# Patient Record
Sex: MALE | Race: BLACK OR AFRICAN AMERICAN | NOT HISPANIC OR LATINO | Employment: FULL TIME | URBAN - METROPOLITAN AREA
[De-identification: names, ages, dates, MRNs, and addresses within clinical notes are randomized per-mention and may not be internally consistent; named-entity substitution may affect disease eponyms.]

---

## 2023-03-30 ENCOUNTER — OFFICE VISIT (OUTPATIENT)
Dept: INTERNAL MEDICINE | Facility: CLINIC | Age: 33
End: 2023-03-30
Payer: COMMERCIAL

## 2023-03-30 VITALS
DIASTOLIC BLOOD PRESSURE: 70 MMHG | TEMPERATURE: 97.7 F | HEIGHT: 70 IN | BODY MASS INDEX: 31.92 KG/M2 | RESPIRATION RATE: 12 BRPM | WEIGHT: 223 LBS | HEART RATE: 62 BPM | SYSTOLIC BLOOD PRESSURE: 118 MMHG | OXYGEN SATURATION: 100 %

## 2023-03-30 DIAGNOSIS — E66.9 OBESITY (BMI 30-39.9): ICD-10-CM

## 2023-03-30 DIAGNOSIS — S46.812A TRAPEZIUS MUSCLE STRAIN, LEFT, INITIAL ENCOUNTER: ICD-10-CM

## 2023-03-30 DIAGNOSIS — S29.012A UPPER BACK STRAIN, INITIAL ENCOUNTER: Primary | ICD-10-CM

## 2023-03-30 DIAGNOSIS — R53.83 OTHER FATIGUE: ICD-10-CM

## 2023-03-30 DIAGNOSIS — R03.0 ELEVATED BLOOD PRESSURE READING: ICD-10-CM

## 2023-03-30 PROBLEM — G89.29 CHRONIC LEFT SHOULDER PAIN: Status: ACTIVE | Noted: 2023-03-30

## 2023-03-30 PROBLEM — M25.512 CHRONIC LEFT SHOULDER PAIN: Status: ACTIVE | Noted: 2023-03-30

## 2023-03-30 LAB
25(OH)D3 SERPL-MCNC: 26.5 NG/ML (ref 30–100)
ALBUMIN SERPL-MCNC: 4.7 G/DL (ref 3.5–5.2)
ALBUMIN/GLOB SERPL: 1.7 G/DL
ALP SERPL-CCNC: 55 U/L (ref 39–117)
ALT SERPL W P-5'-P-CCNC: 39 U/L (ref 1–41)
ANION GAP SERPL CALCULATED.3IONS-SCNC: 10.2 MMOL/L (ref 5–15)
AST SERPL-CCNC: 32 U/L (ref 1–40)
BASOPHILS # BLD AUTO: 0.08 10*3/MM3 (ref 0–0.2)
BASOPHILS NFR BLD AUTO: 1.1 % (ref 0–1.5)
BILIRUB SERPL-MCNC: 0.6 MG/DL (ref 0–1.2)
BUN SERPL-MCNC: 17 MG/DL (ref 6–20)
BUN/CREAT SERPL: 15.3 (ref 7–25)
CALCIUM SPEC-SCNC: 10 MG/DL (ref 8.6–10.5)
CHLORIDE SERPL-SCNC: 101 MMOL/L (ref 98–107)
CHOLEST SERPL-MCNC: 170 MG/DL (ref 0–200)
CO2 SERPL-SCNC: 26.8 MMOL/L (ref 22–29)
CREAT SERPL-MCNC: 1.11 MG/DL (ref 0.76–1.27)
DEPRECATED RDW RBC AUTO: 39.2 FL (ref 37–54)
EGFRCR SERPLBLD CKD-EPI 2021: 90.5 ML/MIN/1.73
EOSINOPHIL # BLD AUTO: 0.19 10*3/MM3 (ref 0–0.4)
EOSINOPHIL NFR BLD AUTO: 2.6 % (ref 0.3–6.2)
ERYTHROCYTE [DISTWIDTH] IN BLOOD BY AUTOMATED COUNT: 12.8 % (ref 12.3–15.4)
FOLATE SERPL-MCNC: 4.7 NG/ML (ref 4.78–24.2)
GLOBULIN UR ELPH-MCNC: 2.7 GM/DL
GLUCOSE SERPL-MCNC: 81 MG/DL (ref 65–99)
HCT VFR BLD AUTO: 45.9 % (ref 37.5–51)
HDLC SERPL-MCNC: 48 MG/DL (ref 40–60)
HGB BLD-MCNC: 14.8 G/DL (ref 13–17.7)
IMM GRANULOCYTES # BLD AUTO: 0.02 10*3/MM3 (ref 0–0.05)
IMM GRANULOCYTES NFR BLD AUTO: 0.3 % (ref 0–0.5)
LDLC SERPL CALC-MCNC: 111 MG/DL (ref 0–100)
LDLC/HDLC SERPL: 2.31 {RATIO}
LYMPHOCYTES # BLD AUTO: 2.06 10*3/MM3 (ref 0.7–3.1)
LYMPHOCYTES NFR BLD AUTO: 27.7 % (ref 19.6–45.3)
MAGNESIUM SERPL-MCNC: 1.8 MG/DL (ref 1.6–2.6)
MCH RBC QN AUTO: 27.3 PG (ref 26.6–33)
MCHC RBC AUTO-ENTMCNC: 32.2 G/DL (ref 31.5–35.7)
MCV RBC AUTO: 84.7 FL (ref 79–97)
MONOCYTES # BLD AUTO: 0.57 10*3/MM3 (ref 0.1–0.9)
MONOCYTES NFR BLD AUTO: 7.7 % (ref 5–12)
NEUTROPHILS NFR BLD AUTO: 4.53 10*3/MM3 (ref 1.7–7)
NEUTROPHILS NFR BLD AUTO: 60.6 % (ref 42.7–76)
NRBC BLD AUTO-RTO: 0 /100 WBC (ref 0–0.2)
PLATELET # BLD AUTO: 217 10*3/MM3 (ref 140–450)
PMV BLD AUTO: 10.8 FL (ref 6–12)
POTASSIUM SERPL-SCNC: 4.3 MMOL/L (ref 3.5–5.2)
PROT SERPL-MCNC: 7.4 G/DL (ref 6–8.5)
RBC # BLD AUTO: 5.42 10*6/MM3 (ref 4.14–5.8)
SODIUM SERPL-SCNC: 138 MMOL/L (ref 136–145)
T3FREE SERPL-MCNC: 3.52 PG/ML (ref 2–4.4)
T4 FREE SERPL-MCNC: 1.3 NG/DL (ref 0.93–1.7)
TRIGL SERPL-MCNC: 56 MG/DL (ref 0–150)
TSH SERPL DL<=0.05 MIU/L-ACNC: 1.47 UIU/ML (ref 0.27–4.2)
VIT B12 BLD-MCNC: 911 PG/ML (ref 211–946)
VLDLC SERPL-MCNC: 11 MG/DL (ref 5–40)
WBC NRBC COR # BLD: 7.45 10*3/MM3 (ref 3.4–10.8)

## 2023-03-30 PROCEDURE — 82306 VITAMIN D 25 HYDROXY: CPT | Performed by: INTERNAL MEDICINE

## 2023-03-30 PROCEDURE — 80061 LIPID PANEL: CPT | Performed by: INTERNAL MEDICINE

## 2023-03-30 PROCEDURE — 82607 VITAMIN B-12: CPT | Performed by: INTERNAL MEDICINE

## 2023-03-30 PROCEDURE — 84481 FREE ASSAY (FT-3): CPT | Performed by: INTERNAL MEDICINE

## 2023-03-30 PROCEDURE — 83735 ASSAY OF MAGNESIUM: CPT | Performed by: INTERNAL MEDICINE

## 2023-03-30 PROCEDURE — 80050 GENERAL HEALTH PANEL: CPT | Performed by: INTERNAL MEDICINE

## 2023-03-30 PROCEDURE — 84439 ASSAY OF FREE THYROXINE: CPT | Performed by: INTERNAL MEDICINE

## 2023-03-30 PROCEDURE — 82746 ASSAY OF FOLIC ACID SERUM: CPT | Performed by: INTERNAL MEDICINE

## 2023-03-30 RX ORDER — NAPROXEN 500 MG/1
500 TABLET ORAL 2 TIMES DAILY WITH MEALS
Qty: 60 TABLET | Refills: 0 | Status: SHIPPED | OUTPATIENT
Start: 2023-03-30

## 2023-03-30 NOTE — PROGRESS NOTES
"CHIEF COMPLAINT  Broolkyn Garcia presents to [facility] to left shoulder pain (31 yo male here today for left shoulder pain. States in been in pain for a while now and seems to be worse after working out. Has tried over the counter creams to help with the pain. ) and Pain     HPI  32-year-old male patient here to establish care.  Main concern is left shoulder pain x 3-4 weeks- lifting 25 lbs 4-5x/week-did a new work out-left post upper back-2-3/10 in severity-using otc muscle rehab  States otc ointment is helping-    Eats out daily-at fast food [employer]-and [employer]  SH-1/2 PPD-works at [employer]-no ETOH--works 12 hr days 7 d/week    Past History:  Allergies: Patient has no known allergies.   Medical History: has no past medical history on file.   Surgical History: has no past surgical history on file.   Family History: family history is not on file.   Social History: reports that he has been smoking cigarettes. He has been smoking an average of .5 packs per day. He has been exposed to tobacco smoke. He has never used smokeless tobacco.  Social History     Social History Narrative   • Not on file         Current Outpatient Medications:   •  naproxen (Naprosyn) 500 MG tablet, Take 1 tablet by mouth 2 (Two) Times a Day With Meals. Prn pain, Disp: 60 tablet, Rfl: 0     OBJECTIVE  Vital Signs  Vitals:    03/30/23 0810 03/30/23 0900   BP: 138/66 118/70   BP Location: Left arm Left arm   Patient Position: Sitting Sitting   Cuff Size:  Large Adult   Pulse: 62    Resp: 12    Temp: 97.7 °F (36.5 °C)    TempSrc: Skin    SpO2: 100%    Weight: 101 kg (223 lb)    Height: 177.8 cm (70\")       Body mass index is 32 kg/m².    Review of Systems left shoulder pain    Physical Exam  Vitals and nursing note reviewed.   Constitutional:       Appearance: Normal appearance. He is obese.   HENT:      Head: Normocephalic and atraumatic.      Nose: Nose normal.   Eyes:      Extraocular Movements: Extraocular " movements intact.      Pupils: Pupils are equal, round, and reactive to light.   Cardiovascular:      Rate and Rhythm: Normal rate and regular rhythm.   Pulmonary:      Effort: Pulmonary effort is normal.      Breath sounds: Normal breath sounds.   Abdominal:      General: Abdomen is flat.      Palpations: Abdomen is soft.   Musculoskeletal:         General: Tenderness present.      Cervical back: Normal range of motion and neck supple.      Comments: Tender to palpation left upper back-neck with full range of motion-negative arc sign full range of motion of left shoulder.   Skin:     General: Skin is warm and dry.   Neurological:      General: No focal deficit present.      Mental Status: He is alert and oriented to person, place, and time.   Psychiatric:         Mood and Affect: Mood normal.         Behavior: Behavior normal.         RESULTS REVIEW  No results found for: PROBNP, BNP          No results found for: TSH   No results found for: FREET4         No Images in the past 120 days found..              ASSESSMENT & PLAN  Diagnoses and all orders for this visit:    1. Upper back strain, initial encounter (Primary)  Comments:  Likely from exacerbation of trapezius muscle due to new workout.  Use anti-inflammatories naproxen 1 twice daily as needed heating pad and OTC ointment prn  Orders:  -     Comprehensive Metabolic Panel; Future  -     Lipid Panel; Future  -     TSH+Free T4; Future  -     T3, Free; Future  -     Vitamin B12; Future  -     Folate; Future  -     Magnesium; Future  -     Vitamin D,25-Hydroxy; Future  -     CBC & Differential; Future  -     naproxen (Naprosyn) 500 MG tablet; Take 1 tablet by mouth 2 (Two) Times a Day With Meals. Prn pain  Dispense: 60 tablet; Refill: 0  -     CBC & Differential  -     Vitamin D,25-Hydroxy  -     Magnesium  -     Folate  -     Vitamin B12  -     T3, Free  -     TSH+Free T4  -     Lipid Panel  -     Comprehensive Metabolic Panel    2. Trapezius muscle strain,  left, initial encounter  Assessment & Plan:  Upper back exercises given use daily.  Avoid excessive use of area.  Follow-up in 2 weeks do labs today.    Orders:  -     Comprehensive Metabolic Panel; Future  -     Lipid Panel; Future  -     TSH+Free T4; Future  -     T3, Free; Future  -     Vitamin B12; Future  -     Folate; Future  -     Magnesium; Future  -     Vitamin D,25-Hydroxy; Future  -     CBC & Differential; Future  -     naproxen (Naprosyn) 500 MG tablet; Take 1 tablet by mouth 2 (Two) Times a Day With Meals. Prn pain  Dispense: 60 tablet; Refill: 0  -     CBC & Differential  -     Vitamin D,25-Hydroxy  -     Magnesium  -     Folate  -     Vitamin B12  -     T3, Free  -     TSH+Free T4  -     Lipid Panel  -     Comprehensive Metabolic Panel    3. Elevated blood pressure reading  Comments:  Ideal BP equals 120/80 average.  Monitor blood pressure decrease going out daily to fast foods decrease salt intake to 2 g daily    4. Obesity (BMI 30-39.9)  -     Comprehensive Metabolic Panel; Future  -     Lipid Panel; Future  -     TSH+Free T4; Future  -     T3, Free; Future  -     Vitamin B12; Future  -     Folate; Future  -     Magnesium; Future  -     Vitamin D,25-Hydroxy; Future  -     CBC & Differential; Future  -     naproxen (Naprosyn) 500 MG tablet; Take 1 tablet by mouth 2 (Two) Times a Day With Meals. Prn pain  Dispense: 60 tablet; Refill: 0  -     CBC & Differential  -     Vitamin D,25-Hydroxy  -     Magnesium  -     Folate  -     Vitamin B12  -     T3, Free  -     TSH+Free T4  -     Lipid Panel  -     Comprehensive Metabolic Panel    5. Other fatigue  -     Comprehensive Metabolic Panel; Future  -     Lipid Panel; Future  -     TSH+Free T4; Future  -     T3, Free; Future  -     Vitamin B12; Future  -     Folate; Future  -     Magnesium; Future  -     Vitamin D,25-Hydroxy; Future  -     CBC & Differential; Future  -     naproxen (Naprosyn) 500 MG tablet; Take 1 tablet by mouth 2 (Two) Times a Day With  Meals. Prn pain  Dispense: 60 tablet; Refill: 0  -     CBC & Differential  -     Vitamin D,25-Hydroxy  -     Magnesium  -     Folate  -     Vitamin B12  -     T3, Free  -     TSH+Free T4  -     Lipid Panel  -     Comprehensive Metabolic Panel                FOLLOW UP  Return in about 2 weeks (around 4/13/2023) for Recheck.  Monitor blood pressure at next visit check on upper back strain and labs.    Patient was given instructions and counseling regarding his condition or for health maintenance advice. Please see specific information pulled into the AVS if appropriate.

## 2023-03-30 NOTE — ASSESSMENT & PLAN NOTE
Upper back exercises given use daily.  Avoid excessive use of area.  Follow-up in 2 weeks do labs today.

## 2023-04-13 NOTE — PROGRESS NOTES
CHIEF COMPLAINT  Brooklyn Garcia presents to [facility] for follow-up of Follow-up (32 year old male here today for a follow up on blood work./He would like a prescription for Diclofenac gel for his left shoulder pain. States the Naproxen is not helping. States he hurt it working out. States decreased range of motion. /He denies any other issues or concerns at this time. ).    HPI    32-year-old male here for follow-up of upper back/left shoulder strain and elevated blood pressure.  Seen as initial visit 2 weeks ago.    Complaint of continued left shoulder pain along clavicle and acromioclavicular joint area rates pain at 2-3 out of 10.  Patient states he is still working and exercising but not lifting as much is 25 pounds.  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  He states his physical work is normal.    Records reviewed, meds reviewed in detail, labs reviewed with patient.  Plan of care is as follows below.    [birth year] visit  Upper back/left shoulder pain x 3-4 weeks- lifting 25 lbs 4-5x/week-did a new work out-left post upper back-2-3/10 in severity-using otc muscle rehab  States otc ointment is helping-        Current Outpatient Medications:   •  naproxen (Naprosyn) 500 MG tablet, Take 1 tablet by mouth 2 (Two) Times a Day With Meals. Prn pain, Disp: 60 tablet, Rfl: 0  •  Diclofenac Sodium (VOLTAREN) 1 % gel gel, Apply 4 g topically to the appropriate area as directed 4 (Four) Times a Day., Disp: 350 g, Rfl: 1  •  methylPREDNISolone (MEDROL) 4 MG dose pack, Take 6 tablets by mouth Daily for 1 day, THEN 5 tablets Daily for 1 day, THEN 4 tablets Daily for 1 day, THEN 3 tablets Daily for 1 day, THEN 2 tablets Daily for 1 day, THEN 1 tablet Daily for 1 day. Take as directed on package instructions., Disp: 21 tablet, Rfl: 0  •  vitamin D (ERGOCALCIFEROL) 1.25 MG (60410 UT) capsule capsule, Take 1 capsule  "by mouth 1 (One) Time Per Week., Disp: 8 capsule, Rfl: 0  •  Vitamin D, Cholecalciferol, 50 MCG (2000 UT) capsule, Take 1 capsule by mouth Daily., Disp: 90 capsule, Rfl: 1   PFSH reviewed.      OBJECTIVE  Vital Signs  Vitals:    04/17/23 0738   BP: 118/78   BP Location: Left arm   Patient Position: Sitting   Pulse: 79   Resp: 18   Temp: 98.4 °F (36.9 °C)   TempSrc: Temporal   SpO2: 98%   Weight: 100 kg (221 lb 6.4 oz)   Height: 177.8 cm (70\")      Body mass index is 31.77 kg/m².    Physical Exam  Vitals and nursing note reviewed.   Constitutional:       Appearance: Normal appearance. He is obese.   HENT:      Head: Normocephalic and atraumatic.      Nose: Nose normal.   Eyes:      Extraocular Movements: Extraocular movements intact.      Pupils: Pupils are equal, round, and reactive to light.   Cardiovascular:      Rate and Rhythm: Normal rate and regular rhythm.   Pulmonary:      Effort: Pulmonary effort is normal.      Breath sounds: Normal breath sounds.   Abdominal:      General: Abdomen is flat.      Palpations: Abdomen is soft.   Musculoskeletal:      Cervical back: Normal range of motion and neck supple.      Comments: Negative arc sign.  Slightly tender at the acromioclavicular joint on the left shoulder.  Good internal/external rotation abduction and adduction.   Skin:     General: Skin is warm and dry.   Neurological:      General: No focal deficit present.      Mental Status: He is alert and oriented to person, place, and time.   Psychiatric:         Mood and Affect: Mood normal.         Behavior: Behavior normal.          RESULTS REVIEW  No results found for: PROBNP, BNP  CMP        3/30/2023    09:16   CMP   Glucose 81     BUN 17     Creatinine 1.11     EGFR 90.5     Sodium 138     Potassium 4.3     Chloride 101     Calcium 10.0     Total Protein 7.4     Albumin 4.7     Globulin 2.7     Total Bilirubin 0.6     Alkaline Phosphatase 55     AST (SGOT) 32     ALT (SGPT) 39     Albumin/Globulin Ratio 1.7   "   BUN/Creatinine Ratio 15.3     Anion Gap 10.2       CBC w/diff        3/30/2023    09:16   CBC w/Diff   WBC 7.45     RBC 5.42     Hemoglobin 14.8     Hematocrit 45.9     MCV 84.7     MCH 27.3     MCHC 32.2     RDW 12.8     Platelets 217     Neutrophil Rel % 60.6     Immature Granulocyte Rel % 0.3     Lymphocyte Rel % 27.7     Monocyte Rel % 7.7     Eosinophil Rel % 2.6     Basophil Rel % 1.1        Lipid Panel        3/30/2023    09:16   Lipid Panel   Total Cholesterol 170     Triglycerides 56     HDL Cholesterol 48     VLDL Cholesterol 11     LDL Cholesterol  111     LDL/HDL Ratio 2.31        Lab Results   Component Value Date    TSH 1.470 03/30/2023      Lab Results   Component Value Date    FREET4 1.30 03/30/2023         Lab Results   Component Value Date    ATAKOOIO36 911 03/30/2023    LGFX49EM 26.5 (L) 03/30/2023    MG 1.8 03/30/2023        No Images in the past 120 days found..             ASSESSMENT & PLAN  Diagnoses and all orders for this visit:    1. Acute pain of left shoulder (Primary)  Comments:  Tender at the acromioclavicular joint.  Negative arc sign pain 2-3out of 10.Plan-give diclofenac gel-do shoulder exercises-declined PT for now.Naprosyn prn  Orders:  -     XR Shoulder 2+ View Left; Future  -     methylPREDNISolone (MEDROL) 4 MG dose pack; Take 6 tablets by mouth Daily for 1 day, THEN 5 tablets Daily for 1 day, THEN 4 tablets Daily for 1 day, THEN 3 tablets Daily for 1 day, THEN 2 tablets Daily for 1 day, THEN 1 tablet Daily for 1 day. Take as directed on package instructions.  Dispense: 21 tablet; Refill: 0  -     Diclofenac Sodium (VOLTAREN) 1 % gel gel; Apply 4 g topically to the appropriate area as directed 4 (Four) Times a Day.  Dispense: 350 g; Refill: 1    2. Vitamin D deficiency  -     vitamin D (ERGOCALCIFEROL) 1.25 MG (33073 UT) capsule capsule; Take 1 capsule by mouth 1 (One) Time Per Week.  Dispense: 8 capsule; Refill: 0  -     Vitamin D, Cholecalciferol, 50 MCG (2000 UT) capsule;  Take 1 capsule by mouth Daily.  Dispense: 90 capsule; Refill: 1    3. Elevated blood pressure reading  Comments:  Blood pressure normal today.  Pain still present but not worse than last visit.              Patient Instructions   X-ray of the left shoulder  Try diclofenac gel as directed  Try Medrol Dosepak  Consider physical therapy if not better declined at this time-use exercises as directed  Follow-up in 2-month  Start vitamin D 50,000 units once a week for 2 months and then D3 2000 units daily         FOLLOW UP  Return in about 2 months (around 6/17/2023) for Recheck.    Patient was given instructions and counseling regarding his condition or for health maintenance advice. Please see specific information pulled into the AVS if appropriate.

## 2023-04-17 ENCOUNTER — OFFICE VISIT (OUTPATIENT)
Dept: INTERNAL MEDICINE | Facility: CLINIC | Age: 33
End: 2023-04-17
Payer: COMMERCIAL

## 2023-04-17 VITALS
WEIGHT: 221.4 LBS | DIASTOLIC BLOOD PRESSURE: 78 MMHG | TEMPERATURE: 98.4 F | OXYGEN SATURATION: 98 % | SYSTOLIC BLOOD PRESSURE: 118 MMHG | RESPIRATION RATE: 18 BRPM | BODY MASS INDEX: 31.7 KG/M2 | HEIGHT: 70 IN | HEART RATE: 79 BPM

## 2023-04-17 DIAGNOSIS — R03.0 ELEVATED BLOOD PRESSURE READING: ICD-10-CM

## 2023-04-17 DIAGNOSIS — E55.9 VITAMIN D DEFICIENCY: ICD-10-CM

## 2023-04-17 DIAGNOSIS — M25.512 ACUTE PAIN OF LEFT SHOULDER: Primary | ICD-10-CM

## 2023-04-17 PROCEDURE — 99213 OFFICE O/P EST LOW 20 MIN: CPT | Performed by: INTERNAL MEDICINE

## 2023-04-17 RX ORDER — ERGOCALCIFEROL 1.25 MG/1
50000 CAPSULE ORAL WEEKLY
Qty: 8 CAPSULE | Refills: 0 | Status: SHIPPED | OUTPATIENT
Start: 2023-04-17

## 2023-04-17 RX ORDER — MULTIVIT-MIN/IRON/FOLIC ACID/K 18-600-40
1 CAPSULE ORAL DAILY
Qty: 90 CAPSULE | Refills: 1 | Status: SHIPPED | OUTPATIENT
Start: 2023-04-17

## 2023-04-17 RX ORDER — METHYLPREDNISOLONE 4 MG/1
TABLET ORAL
Qty: 21 TABLET | Refills: 0 | Status: SHIPPED | OUTPATIENT
Start: 2023-04-17 | End: 2023-04-23

## 2023-04-17 NOTE — PATIENT INSTRUCTIONS
X-ray of the left shoulder  Try diclofenac gel as directed  Try Medrol Dosepak  Consider physical therapy if not better declined at this time-use exercises as directed  Follow-up in 2-month  Start vitamin D 50,000 units once a week for 2 months and then D3 2000 units daily

## 2024-04-02 ENCOUNTER — OFFICE VISIT (OUTPATIENT)
Dept: INTERNAL MEDICINE | Age: 34
End: 2024-04-02
Payer: COMMERCIAL

## 2024-04-02 VITALS
HEIGHT: 70 IN | HEART RATE: 83 BPM | OXYGEN SATURATION: 96 % | WEIGHT: 230 LBS | SYSTOLIC BLOOD PRESSURE: 132 MMHG | BODY MASS INDEX: 32.93 KG/M2 | DIASTOLIC BLOOD PRESSURE: 70 MMHG | TEMPERATURE: 98.4 F

## 2024-04-02 DIAGNOSIS — E55.9 VITAMIN D DEFICIENCY: ICD-10-CM

## 2024-04-02 DIAGNOSIS — M25.512 LEFT SHOULDER PAIN, UNSPECIFIED CHRONICITY: Primary | ICD-10-CM

## 2024-04-02 DIAGNOSIS — D17.1 LIPOMA OF BACK: ICD-10-CM

## 2024-04-02 DIAGNOSIS — Z13.6 ENCOUNTER FOR SCREENING FOR CARDIOVASCULAR DISORDERS: ICD-10-CM

## 2024-04-02 PROCEDURE — 99213 OFFICE O/P EST LOW 20 MIN: CPT | Performed by: INTERNAL MEDICINE

## 2024-04-02 NOTE — PROGRESS NOTES
"Ref surg  CHIEF COMPLAINT  Brooklyn Garcia presents to [facility] for follow-up of Shoulder Pain (Pt is a 33 y.o. male, present in office today with c/o left shoulder pain. Pt has been seen for this issue before, states we have tried oral medication and topical medication.) and Cyst (Pt reports a small \"bump\" on his left back side, states he has had this looked at before.).    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  Seen as initial visit 2 weeks ago.     Complaint of continued left shoulder pain along clavicle and acromioclavicular joint area rates pain at 2-3 out of 10.  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  He states his physical work is normal.     Records reviewed, meds reviewed in detail, labs reviewed with patient.  Plan of care is as follows below.     [birth year] visit  Upper back/left shoulder pain x 3-4 weeks- lifting 25 lbs 4-5x/week-did a new work out-left post upper back-2-3/10 in severity-using otc muscle rehab  States otc ointment is helping-            Current Outpatient Medications:     " "Diclofenac Sodium (VOLTAREN) 1 % gel gel, Apply 4 g topically to the appropriate area as directed 4 (Four) Times a Day., Disp: 350 g, Rfl: 1    naproxen (Naprosyn) 500 MG tablet, Take 1 tablet by mouth 2 (Two) Times a Day With Meals. Prn pain, Disp: 60 tablet, Rfl: 0    vitamin D (ERGOCALCIFEROL) 1.25 MG (64698 UT) capsule capsule, Take 1 capsule by mouth 1 (One) Time Per Week., Disp: 8 capsule, Rfl: 0    Vitamin D, Cholecalciferol, 50 MCG (2000 UT) capsule, Take 1 capsule by mouth Daily., Disp: 90 capsule, Rfl: 1   PFSH reviewed.      OBJECTIVE  Vital Signs  Vitals:    04/02/24 1454   BP: 132/70   BP Location: Left arm   Patient Position: Sitting   Cuff Size: Adult   Pulse: 83   Temp: 98.4 °F (36.9 °C)   TempSrc: Temporal   SpO2: 96%   Weight: 104 kg (230 lb)   Height: 177.8 cm (70\")      Body mass index is 33 kg/m².    Physical Exam  Vitals and nursing note reviewed.   Constitutional:       Appearance: Normal appearance.   HENT:      Head: Normocephalic and atraumatic.      Nose: Nose normal.   Eyes:      Extraocular Movements: Extraocular movements intact.      Pupils: Pupils are equal, round, and reactive to light.   Cardiovascular:      Rate and Rhythm: Normal rate and regular rhythm.   Pulmonary:      Effort: Pulmonary effort is normal.      Breath sounds: Normal breath sounds.   Abdominal:      General: Abdomen is flat.      Palpations: Abdomen is soft.   Musculoskeletal:      Cervical back: Normal range of motion and neck supple.      Comments: Tender at left A-C joint-neg ARC sign-FROM   Skin:     General: Skin is warm and dry.      Comments: 4 cm diameter nodular lesion -on right upper back   Neurological:      General: No focal deficit present.      Mental Status: He is alert and oriented to person, place, and time.   Psychiatric:         Mood and Affect: Mood normal.         Behavior: Behavior normal.          RESULTS REVIEW  No results found for: \"PROBNP\", \"BNP\"          Lab Results   Component Value " Date    TSH 1.470 03/30/2023      Lab Results   Component Value Date    FREET4 1.30 03/30/2023         Lab Results   Component Value Date    [id number] 911 03/30/2023    VDQX21YX 26.5 (L) 03/30/2023    MG 1.8 03/30/2023        No Images in the past 120 days found..             ASSESSMENT & PLAN  Diagnoses and all orders for this visit:    1. Left shoulder pain, unspecified chronicity (Primary)  Comments:  get xray-r/o rotator cuff injury-refer to PT and Ortho  Orders:  -     XR Shoulder 2+ View Left; Future  -     Ambulatory Referral to Physical Therapy Evaluate and treat  -     Ambulatory Referral to Orthopedic Surgery  -     Comprehensive Metabolic Panel; Future  -     Vitamin B12; Future  -     Folate; Future  -     Magnesium; Future  -     Vitamin D,25-Hydroxy; Future    2. Lipoma of back  Comments:  4 cm circular area-- present for months-refer to Surgeon  Orders:  -     XR Shoulder 2+ View Left; Future  -     Ambulatory Referral to Physical Therapy Evaluate and treat  -     Comprehensive Metabolic Panel; Future  -     Vitamin B12; Future  -     Folate; Future  -     Magnesium; Future  -     Vitamin D,25-Hydroxy; Future  -     Ambulatory Referral to General Surgery    3. Encounter for screening for cardiovascular disorders  -     XR Shoulder 2+ View Left; Future  -     Ambulatory Referral to Physical Therapy Evaluate and treat  -     Comprehensive Metabolic Panel; Future  -     Vitamin B12; Future  -     Folate; Future  -     Magnesium; Future  -     Vitamin D,25-Hydroxy; Future    4. Vitamin D deficiency  Comments:  s/p high dose vit D-recheck levels-denies fatigue  Orders:  -     XR Shoulder 2+ View Left; Future  -     Ambulatory Referral to Physical Therapy Evaluate and treat  -     Comprehensive Metabolic Panel; Future  -     Vitamin B12; Future  -     Folate; Future  -     Magnesium; Future  -     Vitamin D,25-Hydroxy; Future         BMI is >= 30 and <35. (Class 1 Obesity). The following options were  offered after discussion;: weight loss educational material (shared in after visit summary), exercise counseling/recommendations, and nutrition counseling/recommendations       Patient Instructions   Get xray of left shoulder  Refer to PT and Ortho  Refer to surgeon for lipoma on back       FOLLOW UP  Return in about 4 weeks (around 4/30/2024) for Recheck, Annual physical.    Patient was given instructions and counseling regarding his condition or for health maintenance advice. Please see specific information pulled into the AVS if appropriate.

## 2024-04-17 ENCOUNTER — OFFICE VISIT (OUTPATIENT)
Dept: SURGERY | Facility: CLINIC | Age: 34
End: 2024-04-17
Payer: COMMERCIAL

## 2024-04-17 VITALS
DIASTOLIC BLOOD PRESSURE: 104 MMHG | WEIGHT: 232 LBS | HEART RATE: 91 BPM | SYSTOLIC BLOOD PRESSURE: 127 MMHG | HEIGHT: 70 IN | BODY MASS INDEX: 33.21 KG/M2

## 2024-04-17 DIAGNOSIS — D17.1 LIPOMA OF BACK: Primary | ICD-10-CM

## 2024-04-17 PROCEDURE — 99203 OFFICE O/P NEW LOW 30 MIN: CPT | Performed by: STUDENT IN AN ORGANIZED HEALTH CARE EDUCATION/TRAINING PROGRAM

## 2024-04-17 NOTE — PROGRESS NOTES
Patient Name:  Brooklyn Garcia  YOB: 1990  [id number]    Referring Provider: Mackenzie Price*    Patient Care Team:  Mackenzie Price MD as PCP - General (Internal Medicine)      Chief Complaint  BACK LIPOMA    Subjective     Brooklyn Garcia is a 33 y.o. male who presents to [facility] GENERAL SURGERY    History of Present Illness  33-year-old male who presents today as a new referral for a lipoma along the upper right side of his back.  Patient states he had a bump in this area for several months and he thinks it may have become somewhat larger.  He does note some mild pain in the area but has no limitations with range of motion.  He denies any nausea, vomiting, fevers, chills.  He has no history of trauma to this area.      History     Past Medical History:   Diagnosis Date    Lipoma of back     Shoulder pain, left        History reviewed. No pertinent surgical history.    Family History   Problem Relation Age of Onset    No Known Problems Mother     No Known Problems Father        Social History     Tobacco Use    Smoking status: Former     Current packs/day: 0.25     Average packs/day: 0.3 packs/day for 6.3 years (1.6 ttl pk-yrs)     Types: Cigarettes     Start date: 2018     Passive exposure: Current    Smokeless tobacco: Never   Vaping Use    Vaping status: Every Day    Substances: Nicotine, Flavoring    Devices: Disposable   Substance Use Topics    Alcohol use: Not Currently    Drug use: Never       No Known Allergies    Prior to Admission medications    Medication Sig Start Date End Date Taking? Authorizing Provider   Diclofenac Sodium (VOLTAREN) 1 % gel gel Apply 4 g topically to the appropriate area as directed 4 (Four) Times a Day.  Patient not taking: Reported on 4/17/2024 4/17/23   Mackenzie Price MD   naproxen (Naprosyn) 500 MG tablet Take 1 tablet by mouth 2 (Two) Times a Day With Meals. Prn pain  Patient not taking: Reported on  "4/17/2024 3/30/23   Mackenzie Price MD   vitamin D (ERGOCALCIFEROL) 1.25 MG (01517 UT) capsule capsule Take 1 capsule by mouth 1 (One) Time Per Week.  Patient not taking: Reported on 4/17/2024 4/17/23   Mackenzie Price MD   Vitamin D, Cholecalciferol, 50 MCG (2000 UT) capsule Take 1 capsule by mouth Daily.  Patient not taking: Reported on 4/17/2024 4/17/23   Mackenzie Price MD       Objective    Objective              Vital Signs:   BP (!) 127/104 (BP Location: Left arm, Patient Position: Sitting, Cuff Size: Adult)   Pulse 91   Ht 177.8 cm (70\")   Wt 105 kg (232 lb)   BMI 33.29 kg/m²       Physical Exam  Constitutional:       Appearance: Normal appearance.   HENT:      Head: Normocephalic and atraumatic.      Mouth/Throat:      Mouth: Mucous membranes are moist.      Pharynx: Oropharynx is clear.   Cardiovascular:      Rate and Rhythm: Normal rate and regular rhythm.   Pulmonary:      Effort: Pulmonary effort is normal. No respiratory distress.   Abdominal:      General: There is no distension.      Palpations: Abdomen is soft.      Tenderness: There is no abdominal tenderness.   Musculoskeletal:         General: No swelling. Normal range of motion.      Cervical back: Normal range of motion and neck supple.      Comments: 6 cm soft lipoma along the posterior right shoulder without any overlying skin changes   Skin:     General: Skin is warm and dry.   Neurological:      General: No focal deficit present.      Mental Status: He is alert and oriented to person, place, and time.   Psychiatric:         Mood and Affect: Mood normal.         Behavior: Behavior normal.                Assessment / Plan      Diagnoses and all orders for this visit:    1. Lipoma of back (Primary)    33-year-old male with lipoma along his upper right back.  He desires surgical excision.  Will plan for outpatient lipoma excision and any other indicated procedure.  " Risks/benefits/alternatives of the procedure were explained to the patient and he was agreeable to proceed.  He would like to first figure out a date within the next 1 to 2 months that is compatible with his work schedule before making a surgery appointment.  Will await further contact from him and schedule in the future.    Follow Up   Return for will call to schedule surgery in the near future.      Patient was given instructions and counseling regarding his condition or for health maintenance advice. Please see specific information pulled into the AVS if appropriate.     Electronically signed by Karel Chun MD, 04/17/24, 3:20 PM EDT.

## 2024-04-25 ENCOUNTER — OFFICE VISIT (OUTPATIENT)
Dept: ORTHOPEDIC SURGERY | Facility: CLINIC | Age: 34
End: 2024-04-25
Payer: COMMERCIAL

## 2024-04-25 VITALS — HEIGHT: 70 IN | WEIGHT: 230 LBS | BODY MASS INDEX: 32.93 KG/M2

## 2024-04-25 DIAGNOSIS — S49.92XS INJURY OF LEFT SHOULDER, SEQUELA: ICD-10-CM

## 2024-04-25 DIAGNOSIS — M25.512 LEFT SHOULDER PAIN, UNSPECIFIED CHRONICITY: Primary | ICD-10-CM

## 2024-04-25 DIAGNOSIS — S43.432A SUPERIOR GLENOID LABRUM LESION OF LEFT SHOULDER, INITIAL ENCOUNTER: ICD-10-CM

## 2024-04-25 NOTE — PROGRESS NOTES
"Chief Complaint  Initial Evaluation of the Left Shoulder     Subjective      Brooklyn Garcia presents to [facility] for evaluation of the left shoulder. He reports left shoulder pain for over a year after a pop in his shoulder while working out. He reports weakness and decreased motion since the injury. He locates pain to the acromioclavicular joint. He has tried topicals and oral NSAIDS without relief.       No Known Allergies     Social History     Socioeconomic History    Marital status: Single   Tobacco Use    Smoking status: Former     Current packs/day: 0.25     Average packs/day: 0.3 packs/day for 6.3 years (1.6 ttl pk-yrs)     Types: Cigarettes     Start date: 2018     Passive exposure: Current    Smokeless tobacco: Never   Vaping Use    Vaping status: Every Day    Substances: Nicotine, Flavoring    Devices: Disposable   Substance and Sexual Activity    Alcohol use: Not Currently    Drug use: Never        I reviewed the patient's chief complaint, history of present illness, review of systems, past medical history, surgical history, family history, social history, medications, and allergy list.     Review of Systems     Constitutional: Denies fevers, chills, weight loss  Cardiovascular: Denies chest pain, shortness of breath  Skin: Denies rashes, acute skin changes  Neurologic: Denies headache, loss of consciousness  MSK: Left shoulder pain      Vital Signs:   Ht 177.8 cm (70\")   Wt 104 kg (230 lb)   BMI 33.00 kg/m²          Physical Exam  General: Alert. No acute distress    Ortho Exam      Left shoulder- Sensation to light touch median, radial, ulnar nerve. Positive AIN, PIN, ulnar nerve motor function. Positive pulses. Tender to the anterior shoulder over the acromioclavicular joint and bicipital groove.  Forward elevation 180. Abduction 160, External Rotation 90, internal rotation 80. 5/5 rotator cuff strength, internal rotation L-1. Positive impingement signs. positive  " O'haris. Negative cross arm adduction     Procedures    X-Ray Report:  Left scapula X-Ray  Indication: Evaluation of left shoulder pain  AP/Lateral view(s)  Findings: no acute fracture. No significant degenerative changes. No osseous abnormality.   Prior studies available for comparison: no       Imaging Results (Most Recent)       Procedure Component Value Units Date/Time    XR Scapula Left [[id number]] Resulted: 04/25/24 1559     Updated: 04/25/24 1603             Result Review :       No results found.           Assessment and Plan     Diagnoses and all orders for this visit:    1. Left shoulder pain, unspecified chronicity (Primary)  -     XR Scapula Left    2. Superior glenoid labrum lesion of left shoulder, initial encounter    3. Injury of left shoulder, sequela        Discussed the treatment plan with the patient.  I reviewed the x-rays that were obtained today with the patient. Plan for MRI Arthrogram to evaluate for a slap tear. The patient expressed understanding and wished to proceed.     Call or return if worsening symptoms.    Follow Up     MRI Arthrogram results      Patient was given instructions and counseling regarding his condition or for health maintenance advice. Please see specific information pulled into the AVS if appropriate.     Scribed for Rafita Nieves MD by Cece Unger.  04/25/24   16:08 EDT    I have personally performed the services described in this document as scribed by the above individual and it is both accurate and complete. Rafita Nieves MD 04/26/24

## 2024-05-15 ENCOUNTER — TREATMENT (OUTPATIENT)
Dept: PHYSICAL THERAPY | Facility: CLINIC | Age: 34
End: 2024-05-15
Payer: COMMERCIAL

## 2024-05-15 DIAGNOSIS — M25.512 ACUTE PAIN OF LEFT SHOULDER: Primary | ICD-10-CM

## 2024-05-15 DIAGNOSIS — R29.898 WEAKNESS OF LEFT UPPER EXTREMITY: ICD-10-CM

## 2024-05-15 NOTE — PROGRESS NOTES
Physical Therapy Initial Evaluation and Plan of Care  [address] [place] [id number]    Patient: Brooklyn Garcia   : 1990  Diagnosis/ICD-10 Code:  Acute pain of left shoulder [M25.512]  Referring practitioner: Mackenzie Diaz  Date of Initial Visit: 5/15/2024  Today's Date: 5/15/2024  Patient seen for 1 sessions           Subjective Questionnaire: QuickDASH:       Subjective Evaluation    History of Present Illness  Mechanism of injury: Pt presents to PT with reports of L shoulder pain. Pt reports a year ago he hurt his L shoulder working out and reports he pulled something in his L shoulder. Pt reports in the last year he has tried topical cream and medication for pain relief with no significant relief of pain. Pt reports he does workout occasionally, but has had to decrease how much weight he is lifting due to pain in shoulder. Pt did have x-ray with no significant findings and is scheduled to have a MRI at the end of the month. Pt has seen orthopedic surgeon who ordered MRI to evaluate for a slap tear.      Patient Occupation: MaintenPersonal Estate Manager technician Pain  Current pain ratin  At best pain ratin  At worst pain rating: 3  Quality: discomfort and dull ache  Aggravating factors: overhead activity, lifting, movement, repetitive movement and outstretched reach    Patient Goals  Patient goals for therapy: decreased pain and increased strength             Objective          Palpation     Additional Palpation Details  No increase in pain with L shoulder palpation.     Neurological Testing     Sensation     Shoulder   Left Shoulder   Intact: light touch    Right Shoulder   Intact: Light touch    Active Range of Motion   Left Shoulder   Flexion: WFL  Abduction: WFL  External rotation BTH: WFL  Internal rotation BTB: WFL    Right Shoulder   Flexion: WFL  Abduction: WFL  External rotation BTH: WFL  Internal rotation BTB: L    Strength/Myotome Testing     Left Shoulder     Planes of  Motion   Flexion: 4-   Abduction: 4-   External rotation at 90°: 4-   Internal rotation at 0°: 4     Isolated Muscles   Biceps: 4   Triceps: 4     Right Shoulder     Planes of Motion   Flexion: 5   Abduction: 5   External rotation at 90°: 5   Internal rotation at 0°: 5     Isolated Muscles   Biceps: 5   Triceps: 5         See Exercise, Manual, and Modality Logs for complete treatment.       Assessment & Plan       Assessment  Impairments: abnormal or restricted ROM, activity intolerance, impaired physical strength, lacks appropriate home exercise program and pain with function   Functional limitations: carrying objects, lifting, pushing, uncomfortable because of pain, reaching behind back, reaching overhead and unable to perform repetitive tasks   Assessment details: Pt presents to PT with reports of L shoulder pain. Pt does have significant weakness in L UE compared to R UE with increase in L shoulder pain with MMT. Pt also has decrease in functional mobility as demonstrated by QuickDASH score. Pt does require skilled PT in order to address deficits listed to return patient back to maximum function. However, will be placing patient on HOLD until after MRI and follow-up with orthopedic surgeon. Pending on orthopedic surgeon's plan after MRI will continue with PT after. Discussed plan with patient and patient agreed.   Prognosis: good    Goals  Plan Goals: 1. The patient has limited strength of the left shoulder.    LTG 1: 12 weeks: The patient will demonstrate 5/5 strength for left shoulder flexion, abduction, external rotation, and internal rotation in order to demonstrate improved shoulder stability.    STATUS: New      STG 1a: 6 weeks: The patient will demonstrate 4+/5 strength for left shoulder flexion, abduction, external rotation, and internal rotation.    STATUS: New      STG2: 6 weeks: The patient will be independent with home exercises.    STATUS: New    3. The patient complains of pain to the left  shoulder.    LTG 3: 12 weeks: The patient will report a pain rating of 1/10 or better in order to improve sleep quality and tolerance to performance of activities of daily living.    STATUS: New    STG 3a: 6 weeks: The patient will report a pain rating of 3/10 or better.    STATUS: New    Carrying, Moving, and Handling Objects Functional Limitation    LTG 4: 12 weeks: The patient will demonstrate 0% limitation by achieving a score of 11 on the Quick DASH.    STATUS: New    STG 4a: 6 weeks: The patient will demonstrate 1-19% limitation by achieving a score of 15 on the Quick DASH.    STATUS: New        Plan  Therapy options: will be seen for skilled therapy services  Planned modality interventions: cryotherapy, TENS, thermotherapy (hydrocollator packs) and electrical stimulation/Russian stimulation  Planned therapy interventions: abdominal trunk stabilization, ADL retraining, body mechanics training, flexibility, functional ROM exercises, home exercise program, IADL retraining, joint mobilization, manual therapy, neuromuscular re-education, postural training, soft tissue mobilization, spinal/joint mobilization, strengthening, stretching and therapeutic activities  Frequency: HOLD until after MRI and ortho appointment.  Treatment plan discussed with: patient        History # of Personal Factors and/or Comorbidities: LOW (0)  Examination of Body System(s): # of elements: LOW (1-2)  Clinical Presentation: STABLE   Clinical Decision Making: LOW       Timed:         Manual Therapy:    0     mins  70265;     Therapeutic Exercise:    0     mins  14727;     Neuromuscular Carly:    0    mins  41986;    Therapeutic Activity:     0     mins  37061;     Gait Trainin     mins  32027;     Ultrasound:     0     mins  70030;    Ionto                               0    mins   47311  Self pay                         0     mins [id number]    Un-Timed:  Electrical Stimulation:    0     mins  58223 ( )  Traction     0      mins 81700  Low Eval     30     Mins  80339  Mod Eval     0     Mins  76985  High Eval                       0     Mins  50982  Self Pay Eval                 0     PTSP1   Re-Eval                           0    mins  01703        Timed Treatment:   0   mins   Total Treatment:     30   mins    PT SIGNATURE: Electronically signed by [facility] [place] LICENSE: [id number]    DATE TREATMENT INITIATED: 5/15/2024    Initial Certification  Certification Period: 5/15/2024 thru 8/12/2024  I certify that the therapy services are furnished while this patient is under my care.  The services outlined above are required by this patient, and will be reviewed every 90 days.     PHYSICIAN: Mackenzie Price MD   NPI: [id number]      DATE:     Please sign and return via fax to [phone] Thank you, [facility].

## 2024-05-31 ENCOUNTER — HOSPITAL ENCOUNTER (OUTPATIENT)
Dept: INTERVENTIONAL RADIOLOGY/VASCULAR | Facility: HOSPITAL | Age: 34
Discharge: HOME | End: 2024-05-31
Payer: COMMERCIAL

## 2024-05-31 ENCOUNTER — HOSPITAL ENCOUNTER (OUTPATIENT)
Dept: MRI IMAGING | Facility: HOSPITAL | Age: 34
Discharge: HOME | End: 2024-05-31
Payer: COMMERCIAL

## 2024-05-31 DIAGNOSIS — S43.432A SUPERIOR GLENOID LABRUM LESION OF LEFT SHOULDER, INITIAL ENCOUNTER: ICD-10-CM

## 2024-05-31 DIAGNOSIS — M25.512 LEFT SHOULDER PAIN, UNSPECIFIED CHRONICITY: ICD-10-CM

## 2024-05-31 DIAGNOSIS — S49.92XS INJURY OF LEFT SHOULDER, SEQUELA: ICD-10-CM

## 2024-05-31 PROCEDURE — 73222 MRI JOINT UPR EXTREM W/DYE: CPT

## 2024-05-31 PROCEDURE — 0 GADOBENATE DIMEGLUMINE 529 MG/ML SOLUTION: Performed by: ORTHOPAEDIC SURGERY

## 2024-05-31 PROCEDURE — 77002 NEEDLE LOCALIZATION BY XRAY: CPT

## 2024-05-31 PROCEDURE — A9577 INJ MULTIHANCE: HCPCS | Performed by: ORTHOPAEDIC SURGERY

## 2024-05-31 PROCEDURE — 25510000001 IOPAMIDOL 61 % SOLUTION: Performed by: ORTHOPAEDIC SURGERY

## 2024-05-31 RX ORDER — IOPAMIDOL 612 MG/ML
15 INJECTION, SOLUTION INTRATHECAL
Status: COMPLETED | OUTPATIENT
Start: 2024-05-31 | End: 2024-05-31

## 2024-05-31 RX ORDER — LIDOCAINE HYDROCHLORIDE 20 MG/ML
20 INJECTION, SOLUTION INFILTRATION; PERINEURAL ONCE
Status: COMPLETED | OUTPATIENT
Start: 2024-05-31 | End: 2024-05-31

## 2024-05-31 RX ORDER — SODIUM CHLORIDE 9 MG/ML
10 INJECTION, SOLUTION INTRAMUSCULAR; INTRAVENOUS; SUBCUTANEOUS AS NEEDED
Status: DISCONTINUED | OUTPATIENT
Start: 2024-05-31 | End: 2024-06-01 | Stop reason: HOSPADM

## 2024-05-31 RX ADMIN — IOPAMIDOL 5 ML: 612 INJECTION, SOLUTION INTRATHECAL at 09:46

## 2024-05-31 RX ADMIN — SODIUM BICARBONATE 1 ML: 84 INJECTION, SOLUTION INTRAVENOUS at 09:40

## 2024-05-31 RX ADMIN — GADOBENATE DIMEGLUMINE 0.1 ML: 529 INJECTION, SOLUTION INTRAVENOUS at 09:46

## 2024-05-31 RX ADMIN — LIDOCAINE HYDROCHLORIDE 5 ML: 20 INJECTION, SOLUTION INFILTRATION; PERINEURAL at 09:40

## 2024-06-11 ENCOUNTER — OFFICE VISIT (OUTPATIENT)
Dept: ORTHOPEDIC SURGERY | Facility: CLINIC | Age: 34
End: 2024-06-11
Payer: COMMERCIAL

## 2024-06-11 VITALS
HEART RATE: 70 BPM | OXYGEN SATURATION: 96 % | WEIGHT: 230 LBS | HEIGHT: 70 IN | DIASTOLIC BLOOD PRESSURE: 87 MMHG | SYSTOLIC BLOOD PRESSURE: 131 MMHG | BODY MASS INDEX: 32.93 KG/M2

## 2024-06-11 DIAGNOSIS — M75.112 NONTRAUMATIC INCOMPLETE TEAR OF LEFT ROTATOR CUFF: Primary | ICD-10-CM

## 2024-06-11 DIAGNOSIS — M19.019 OSTEOARTHRITIS OF AC (ACROMIOCLAVICULAR) JOINT: ICD-10-CM

## 2024-06-11 PROCEDURE — 20610 DRAIN/INJ JOINT/BURSA W/O US: CPT | Performed by: ORTHOPAEDIC SURGERY

## 2024-06-11 PROCEDURE — 99213 OFFICE O/P EST LOW 20 MIN: CPT | Performed by: ORTHOPAEDIC SURGERY

## 2024-06-11 RX ORDER — TRIAMCINOLONE ACETONIDE 40 MG/ML
40 INJECTION, SUSPENSION INTRA-ARTICULAR; INTRAMUSCULAR
Status: COMPLETED | OUTPATIENT
Start: 2024-06-11 | End: 2024-06-11

## 2024-06-11 RX ORDER — LIDOCAINE HYDROCHLORIDE 10 MG/ML
5 INJECTION, SOLUTION INFILTRATION; PERINEURAL
Status: COMPLETED | OUTPATIENT
Start: 2024-06-11 | End: 2024-06-11

## 2024-06-11 RX ADMIN — TRIAMCINOLONE ACETONIDE 40 MG: 40 INJECTION, SUSPENSION INTRA-ARTICULAR; INTRAMUSCULAR at 15:17

## 2024-06-11 RX ADMIN — LIDOCAINE HYDROCHLORIDE 5 ML: 10 INJECTION, SOLUTION INFILTRATION; PERINEURAL at 15:17

## 2024-06-11 NOTE — PROGRESS NOTES
"Chief Complaint  Follow-up and Pain of the Left Shoulder     Subjective      Brooklyn Garcia presents to [facility] for a follow up for his left shoulder. He was last seen in the office on 04/25/24 where we ordered an MRI arthrogram on his shoulder. He reports left shoulder pain for over a year after a pop in his shoulder while working out. He reports weakness and decreased motion since the injury. He locates pain to the acromioclavicular joint. He has tried topicals and oral NSAIDS without relief. He has been attending outpatient physical therapy and states this has not given him much relief.     No Known Allergies     Social History     Socioeconomic History    Marital status: Single   Tobacco Use    Smoking status: Former     Current packs/day: 0.25     Average packs/day: 0.3 packs/day for 6.4 years (1.6 ttl pk-yrs)     Types: Cigarettes     Start date: 2018     Passive exposure: Current    Smokeless tobacco: Never   Vaping Use    Vaping status: Every Day    Substances: Nicotine, Flavoring    Devices: Disposable   Substance and Sexual Activity    Alcohol use: Not Currently    Drug use: Never        I reviewed the patient's chief complaint, history of present illness, review of systems, past medical history, surgical history, family history, social history, medications, and allergy list.     Review of Systems     Constitutional: Denies fevers, chills, weight loss  Cardiovascular: Denies chest pain, shortness of breath  Skin: Denies rashes, acute skin changes  Neurologic: Denies headache, loss of consciousness  MSK: Left shoulder pain       Vital Signs:   /87   Pulse 70   Ht 177.8 cm (70\")   Wt 104 kg (230 lb)   SpO2 96%   BMI 33.00 kg/m²            Ortho Exam    Physical Exam  General:Alert. No acute distress   Left upper extremity: Sensation to light touch median, radial, ulnar nerve. Positive AIN, PIN, ulnar nerve motor function. Positive pulses. Tender to the anterior " shoulder over the acromioclavicular joint and bicipital groove. Forward elevation 180. Abduction 160, External Rotation 90, internal rotation 80. 5/5 rotator cuff strength, internal rotation L-1. Positive impingement signs. positive O'haris. Negative cross arm adduction     Large Joint: L subacromial bursa  Date/Time: 6/11/2024 3:17 PM  Consent given by: patient  Site marked: site marked  Timeout: Immediately prior to procedure a time out was called to verify the correct patient, procedure, equipment, support staff and site/side marked as required   Supporting Documentation  Indications: pain   Procedure Details  Location: shoulder - L subacromial bursa  Preparation: Patient was prepped and draped in the usual sterile fashion  Needle gauge: 21G.  Medications administered: 5 mL lidocaine 1 %; 40 mg triamcinolone acetonide 40 MG/ML  Patient tolerance: patient tolerated the procedure well with no immediate complications    This injection documentation was Scribed for Rafita Nieves MD by Ping Batista CMA.  06/11/24   15:17 EDT   Imaging Results (Most Recent)       None             Result Review :       FL Contrast Injection CT / MRI    Result Date: 5/31/2024  Narrative: FL CONTRAST INJECTION CT/MRI-  Date of Exam: 5/31/2024 9:10 AM  Indication: Left shoulder pain; M25.512-Pain in left shoulder; S43.432A-Superior glenoid labrum lesion of left shoulder, initial encounter; S49.92XS-Unspecified injury of left shoulder and upper arm, sequela.  Fluoroscopic Time: 0.1 minutes  Findings: The risks, benefits and alternatives of the procedure were explained to the patient. The Chief risks discussed were bleeding, infection and allergic reaction. The chief options discussed were doing nothing and doing the MRI without contrast. The patient indicated he understood what was discussed and elected to proceed. He provided written consent.  The skin overlying the left shoulder joint was prepped and draped in normal sterile  fashion. 2% lidocaine was injected along the anticipated tract of the needle. A 22-gauge needle was advanced into the glenohumeral joint under fluoroscopic guidance. 14 cc of a mixture containing 10 cc sterile saline, 4 cc iodinated contrast and 0.1 cc gadolinium was injected. The needle was withdrawn. The patient demonstrated no immediate complication was transferred to MRI for further imaging.      Impression: Impression: Successful left shoulder arthrogram performed for planned MRI and later today.   Electronically Signed By-Sachin Power MD On:5/31/2024 1:30 PM      MRI Shoulder Left Arthrogram    Result Date: 5/31/2024  Narrative: MRI SHOULDER LEFT ARTHROGRAM-  Date of Exam: 5/31/2024 9:45 AM  Indication: Left shoulder pain; M25.512-Pain in left shoulder; S43.432A-Superior glenoid labrum lesion of left shoulder, initial encounter; S49.92XS-Unspecified injury of left shoulder and upper arm, sequela.  Comparison: 2 view left scapula dated 4/25/2024  Technique:  Routine multiplanar/multisequence images of the left shoulder was obtained following the uneventful intraarticular injection of contrast.    Findings: No fracture or malalignment is identified. Marrow signal appears normal.  Mild to moderate acromioclavicular osteoarthritis is noted. No significant AC joint effusion is present. A type III acromion is present.  Mild intermediate signal in the supraspinatus tendon is consistent with tendinopathy. There is a tiny partial-thickness articular surface tear of the supraspinatus near the musculotendinous junction. The tear measures 3.8 cm transverse and involves approximately 30% of tendon thickness. The rotator cuff otherwise appears unremarkable. No muscle body atrophy or signal alteration is noted surrounding the shoulder girdle.  The biceps long head tendon and its attachment to the superior labrum are intact. There is a tear/partial detachment of the posteroinferior labrum with contrast partially extending  between the labrum and glenoid. The labrum otherwise appears unremarkable.  Cartilage in the glenohumeral joint is intact. No loose body is seen. No loose body is seen. The glenohumeral ligaments are intact.       Impression: Impression: 1.  Mild to moderate acromioclavicular osteoarthritis. 2.  Mild supraspinatus tendinopathy. Tiny articular surface tear of the supraspinatus at the musculotendinous junction. 3.  Tear/partial detachment of the posteroinferior labrum.   Electronically Signed By-Sachin Power MD On:5/31/2024 1:14 PM              Assessment and Plan     Diagnoses and all orders for this visit:    1. Nontraumatic incomplete tear of left rotator cuff (Primary)    2. Osteoarthritis of AC (acromioclavicular) joint        The patient presents here today for a follow up for his left shoulder, MRI arthrogram results were discussed and reviewed with the patient today.     Discussed operative treatment options regarding a left shoulder arthroscopy versus non operative treatment options regarding injections, medications and physical therapy.     Patient wishes to proceed with non operative treatment. Patient expressed understanding and wishes to proceed. He tolerated the injection well and without any complications.     Call or return if worsening symptoms.    Follow Up     3 months       Patient was given instructions and counseling regarding his condition or for health maintenance advice. Please see specific information pulled into the AVS if appropriate.     Scribed for Rafita Nieves MD by Jackie Thompson.  06/11/24   14:28 EDT    I have personally performed the services described in this document as scribed by the above individual and it is both accurate and complete. Rafita Nieves MD 06/11/24

## 2024-08-01 ENCOUNTER — OFFICE VISIT (OUTPATIENT)
Dept: ORTHOPEDIC SURGERY | Facility: CLINIC | Age: 34
End: 2024-08-01
Payer: COMMERCIAL

## 2024-08-01 VITALS
WEIGHT: 230 LBS | BODY MASS INDEX: 32.93 KG/M2 | HEIGHT: 70 IN | SYSTOLIC BLOOD PRESSURE: 128 MMHG | HEART RATE: 55 BPM | DIASTOLIC BLOOD PRESSURE: 88 MMHG | OXYGEN SATURATION: 94 %

## 2024-08-01 DIAGNOSIS — M19.019 OSTEOARTHRITIS OF AC (ACROMIOCLAVICULAR) JOINT: ICD-10-CM

## 2024-08-01 DIAGNOSIS — M25.512 LEFT SHOULDER PAIN, UNSPECIFIED CHRONICITY: ICD-10-CM

## 2024-08-01 DIAGNOSIS — M75.112 NONTRAUMATIC INCOMPLETE TEAR OF LEFT ROTATOR CUFF: Primary | ICD-10-CM

## 2024-08-01 DIAGNOSIS — S49.92XS INJURY OF LEFT SHOULDER, SEQUELA: ICD-10-CM

## 2024-08-01 PROBLEM — S49.92XA INJURY OF LEFT SHOULDER: Status: ACTIVE | Noted: 2024-08-01

## 2024-08-01 NOTE — PROGRESS NOTES
"Chief Complaint  Follow-up of the Left Shoulder    Subjective          History of Present Illness      Brooklyn Garcia is a 33 y.o. male  presents to [facility] for     Patient presents for follow-up evaluation of left shoulder pain left partial rotator cuff tear and AC arthritis.  Reviewed his MRI arthrogram with Dr. Nieves at last visit on 6/11/2024 and Dr. Nieves discussed surgical versus conservative treatment with the patient.  Patient received an injection which he states did not help his pain much but he states he is able to lift more and tolerate his home exercise program.  He denies new injury or symptoms of pain he would like to continue conservative treatment.  He denies taking NSAIDs or muscle relaxers.      No Known Allergies     Social History     Socioeconomic History    Marital status: Single   Tobacco Use    Smoking status: Former     Current packs/day: 0.25     Average packs/day: 0.3 packs/day for 6.6 years (1.6 ttl pk-yrs)     Types: Cigarettes     Start date: 2018     Passive exposure: Current    Smokeless tobacco: Never   Vaping Use    Vaping status: Every Day    Substances: Nicotine, Flavoring    Devices: Disposable   Substance and Sexual Activity    Alcohol use: Not Currently    Drug use: Never        REVIEW OF SYSTEMS    Constitutional: Awake alert and oriented x3, no acute distress, denies fevers, chills, weight loss  Respiratory: No respiratory distress  Vascular: Brisk cap refill, Intact distal pulses, No cyanosis, compartments soft with no signs or symptoms of compartment syndrome or DVT.   Cardiovascular: Denies chest pain, shortness of breath  Skin: Denies rashes, acute skin changes  Neurologic: Denies headache, loss of consciousness  MSK: Left shoulder pain      Objective   Vital Signs:   /88   Pulse 55   Ht 177.8 cm (70\")   Wt 104 kg (230 lb)   SpO2 94%   BMI 33.00 kg/m²     Body mass index is 33 kg/m².    Physical Exam       Left " shoulder:  Tender to the anterior shoulder over the acromioclavicular joint and bicipital groove.  Active forward elevation 180.  Active abduction 160, External Rotation 90, internal rotation 80. 5/5 rotator cuff strength, internal rotation L-1. Positive impingement signs. positive O'haris. Negative cross arm adduction 5 out of 5 strength, neurovascularly intact      Procedures    Imaging Results (Most Recent)       None             Result Review :   The following data was reviewed by: AMANDA Puentes on 08/01/2024:               Assessment and Plan    Diagnoses and all orders for this visit:    1. Nontraumatic incomplete tear of left rotator cuff (Primary)    2. Osteoarthritis of AC (acromioclavicular) joint    3. Left shoulder pain, unspecified chronicity    4. Injury of left shoulder, sequela        Discussed diagnosis and treatment options with the patient we discussed continuing conservative treatment he would like to follow-up in 6 weeks for another injection, we discussed starting a anti-inflammatory or a muscle relaxer he would like to avoid this for now, continue exercise, and activity as tolerated.  Follow-up in 6 weeks for recheck  Call or return if worsening symptoms.    Follow Up   Return in about 6 weeks (around 9/12/2024) for Recheck.  Patient was given instructions and counseling regarding his condition or for health maintenance advice. Please see specific information pulled into the AVS if appropriate.       EMR Dragon/Transcription disclaimer:  Part of this note may be an electronic transcription/translation of spoken language to printed text using the Dragon Dictation System

## 2024-09-04 ENCOUNTER — TELEPHONE (OUTPATIENT)
Dept: SURGERY | Facility: CLINIC | Age: 34
End: 2024-09-04
Payer: COMMERCIAL

## 2024-09-04 DIAGNOSIS — D17.9 LIPOMA, UNSPECIFIED SITE: Primary | ICD-10-CM

## 2024-09-04 RX ORDER — SODIUM CHLORIDE 0.9 % (FLUSH) 0.9 %
10 SYRINGE (ML) INJECTION EVERY 12 HOURS SCHEDULED
OUTPATIENT
Start: 2024-09-04

## 2024-09-04 RX ORDER — SODIUM CHLORIDE 9 MG/ML
40 INJECTION, SOLUTION INTRAVENOUS AS NEEDED
OUTPATIENT
Start: 2024-09-04

## 2024-09-04 RX ORDER — ONDANSETRON 2 MG/ML
4 INJECTION INTRAMUSCULAR; INTRAVENOUS EVERY 6 HOURS PRN
OUTPATIENT
Start: 2024-09-04

## 2024-09-04 RX ORDER — SODIUM CHLORIDE, SODIUM LACTATE, POTASSIUM CHLORIDE, CALCIUM CHLORIDE 600; 310; 30; 20 MG/100ML; MG/100ML; MG/100ML; MG/100ML
70 INJECTION, SOLUTION INTRAVENOUS CONTINUOUS
OUTPATIENT
Start: 2024-09-04

## 2024-09-04 RX ORDER — SODIUM CHLORIDE 0.9 % (FLUSH) 0.9 %
10 SYRINGE (ML) INJECTION AS NEEDED
OUTPATIENT
Start: 2024-09-04

## 2024-09-04 NOTE — TELEPHONE ENCOUNTER
PATIENT CALLED AND WANTS TO SCHEDULE  SURGERY TO REMOVE A LIPOMA FROM HIS BACK.  HE THINKS IT WAS SOMETHING IN THE OFFICE.  HE HAD HIS CONSULTATION 04/17/24.    #[phone]

## 2024-09-05 PROBLEM — D17.9 LIPOMA: Status: ACTIVE | Noted: 2024-09-04

## 2024-09-05 NOTE — TELEPHONE ENCOUNTER
Patient called back and was scheduled for 9/26/24. He agrees to this date and was advised that someone from the hospital will call him on 9/25/24 sometime after 1pm with his arrival time. Patient v/u

## 2024-09-13 ENCOUNTER — OFFICE VISIT (OUTPATIENT)
Dept: ORTHOPEDIC SURGERY | Facility: CLINIC | Age: 34
End: 2024-09-13
Payer: COMMERCIAL

## 2024-09-13 VITALS
DIASTOLIC BLOOD PRESSURE: 86 MMHG | HEART RATE: 69 BPM | OXYGEN SATURATION: 98 % | SYSTOLIC BLOOD PRESSURE: 134 MMHG | WEIGHT: 230 LBS | BODY MASS INDEX: 32.93 KG/M2 | HEIGHT: 70 IN

## 2024-09-13 DIAGNOSIS — M19.019 OSTEOARTHRITIS OF AC (ACROMIOCLAVICULAR) JOINT: ICD-10-CM

## 2024-09-13 DIAGNOSIS — S49.92XS INJURY OF LEFT SHOULDER, SEQUELA: ICD-10-CM

## 2024-09-13 DIAGNOSIS — M75.112 NONTRAUMATIC INCOMPLETE TEAR OF LEFT ROTATOR CUFF: Primary | ICD-10-CM

## 2024-09-13 DIAGNOSIS — M25.512 LEFT SHOULDER PAIN, UNSPECIFIED CHRONICITY: ICD-10-CM

## 2024-09-13 NOTE — PROGRESS NOTES
"Chief Complaint  Follow-up of the Left Shoulder    Subjective          History of Present Illness      Brooklyn Garcia is a 33 y.o. male  presents to [facility] for     Patient presents for follow-up evaluation of left shoulder pain, left partial rotator cuff tear and AC arthritis.  He has had MRI arthrogram and Dr. Nieves has and the patient decided he would pursue conservative treatment.  He states his shoulder is not causing him any pain today he states he recently moved homes and with all this activity he denies new injury or symptoms of pain he states that he is not having to take any medication for pain either.  He states he is not working out like his normal routine would be but that is because his life has been very busy.  He denies need for further treatment at this time.      No Known Allergies     Social History     Socioeconomic History    Marital status: Single   Tobacco Use    Smoking status: Former     Current packs/day: 0.25     Average packs/day: 0.3 packs/day for 6.7 years (1.7 ttl pk-yrs)     Types: Cigarettes     Start date: 2018     Passive exposure: Current    Smokeless tobacco: Never   Vaping Use    Vaping status: Every Day    Substances: Nicotine, Flavoring    Devices: Disposable   Substance and Sexual Activity    Alcohol use: Yes     Comment: social    Drug use: Never    Sexual activity: Defer        REVIEW OF SYSTEMS    Constitutional: Awake alert and oriented x3, no acute distress, denies fevers, chills, weight loss  Respiratory: No respiratory distress  Vascular: Brisk cap refill, Intact distal pulses, No cyanosis, compartments soft with no signs or symptoms of compartment syndrome or DVT.   Cardiovascular: Denies chest pain, shortness of breath  Skin: Denies rashes, acute skin changes  Neurologic: Denies headache, loss of consciousness  MSK: Left shoulder pain      Objective   Vital Signs:   /86   Pulse 69   Ht 177.8 cm (70\")   Wt 104 kg (230 lb) "   SpO2 98%   BMI 33.00 kg/m²     Body mass index is 33 kg/m².    Physical Exam       Left shoulder: Nontender to palpation, no pain with range of motion no pain with resisted range of motion, 5 out of 5 strength, active forward elevation 180 active abduction 140 external rotation with abduction 90 internal rotation to T10, 5 out of 5 strength, neurovascularly intact      Procedures    Imaging Results (Most Recent)       None             Result Review :   The following data was reviewed by: AMANDA Puentes on 09/13/2024:               Assessment and Plan    Diagnoses and all orders for this visit:    1. Nontraumatic incomplete tear of left rotator cuff (Primary)    2. Osteoarthritis of AC (acromioclavicular) joint    3. Left shoulder pain, unspecified chronicity    4. Injury of left shoulder, sequela        Discussed diagnosis and treatment options with the patient he was advised that he could follow-up at any time for an injection, if anything worsens he may follow-up and discuss surgery follow-up as needed at this time    Call or return if worsening symptoms.    Follow Up   Return if symptoms worsen or fail to improve.  Patient was given instructions and counseling regarding his condition or for health maintenance advice. Please see specific information pulled into the AVS if appropriate.       EMR Dragon/Transcription disclaimer:  Part of this note may be an electronic transcription/translation of spoken language to printed text using the Dragon Dictation System

## 2024-09-25 ENCOUNTER — TELEPHONE (OUTPATIENT)
Dept: UROLOGY | Facility: CLINIC | Age: 34
End: 2024-09-25
Payer: COMMERCIAL

## 2024-09-25 ENCOUNTER — DOCUMENTATION (OUTPATIENT)
Dept: PHYSICAL THERAPY | Facility: CLINIC | Age: 34
End: 2024-09-25
Payer: COMMERCIAL

## 2024-10-22 ENCOUNTER — TELEPHONE (OUTPATIENT)
Dept: SURGERY | Facility: CLINIC | Age: 34
End: 2024-10-22
Payer: COMMERCIAL

## 2024-10-22 NOTE — TELEPHONE ENCOUNTER
Attempted to call patient back to reschedule sx. Patient did not answer. Left message requesting a call back.

## 2024-11-13 ENCOUNTER — ANESTHESIA EVENT (OUTPATIENT)
Dept: PERIOP | Facility: HOSPITAL | Age: 34
End: 2024-11-13
Payer: COMMERCIAL

## 2024-11-14 ENCOUNTER — HOSPITAL ENCOUNTER (OUTPATIENT)
Facility: HOSPITAL | Age: 34
Setting detail: HOSPITAL OUTPATIENT SURGERY
Discharge: HOME | End: 2024-11-14
Attending: STUDENT IN AN ORGANIZED HEALTH CARE EDUCATION/TRAINING PROGRAM | Admitting: STUDENT IN AN ORGANIZED HEALTH CARE EDUCATION/TRAINING PROGRAM
Payer: COMMERCIAL

## 2024-11-14 ENCOUNTER — ANESTHESIA (OUTPATIENT)
Dept: PERIOP | Facility: HOSPITAL | Age: 34
End: 2024-11-14
Payer: COMMERCIAL

## 2024-11-14 VITALS
DIASTOLIC BLOOD PRESSURE: 59 MMHG | HEART RATE: 67 BPM | HEIGHT: 70 IN | OXYGEN SATURATION: 96 % | WEIGHT: 235.89 LBS | RESPIRATION RATE: 16 BRPM | SYSTOLIC BLOOD PRESSURE: 108 MMHG | TEMPERATURE: 96.7 F | BODY MASS INDEX: 33.77 KG/M2

## 2024-11-14 DIAGNOSIS — D17.9 LIPOMA, UNSPECIFIED SITE: ICD-10-CM

## 2024-11-14 PROCEDURE — 25010000002 ONDANSETRON PER 1 MG: Performed by: NURSE ANESTHETIST, CERTIFIED REGISTERED

## 2024-11-14 PROCEDURE — 25010000002 MIDAZOLAM PER 1MG: Performed by: STUDENT IN AN ORGANIZED HEALTH CARE EDUCATION/TRAINING PROGRAM

## 2024-11-14 PROCEDURE — 21931 EXC BACK LES SC 3 CM/>: CPT | Performed by: STUDENT IN AN ORGANIZED HEALTH CARE EDUCATION/TRAINING PROGRAM

## 2024-11-14 PROCEDURE — 25810000003 LACTATED RINGERS PER 1000 ML: Performed by: STUDENT IN AN ORGANIZED HEALTH CARE EDUCATION/TRAINING PROGRAM

## 2024-11-14 PROCEDURE — 25010000002 PROPOFOL 10 MG/ML EMULSION: Performed by: NURSE ANESTHETIST, CERTIFIED REGISTERED

## 2024-11-14 PROCEDURE — 25010000002 LIDOCAINE 1% - EPINEPHRINE 1:100000 1 %-1:100000 SOLUTION: Performed by: STUDENT IN AN ORGANIZED HEALTH CARE EDUCATION/TRAINING PROGRAM

## 2024-11-14 PROCEDURE — 25010000002 LIDOCAINE PF 2% 2 % SOLUTION: Performed by: NURSE ANESTHETIST, CERTIFIED REGISTERED

## 2024-11-14 PROCEDURE — 88304 TISSUE EXAM BY PATHOLOGIST: CPT | Performed by: STUDENT IN AN ORGANIZED HEALTH CARE EDUCATION/TRAINING PROGRAM

## 2024-11-14 PROCEDURE — 25010000002 DEXAMETHASONE PER 1 MG: Performed by: NURSE ANESTHETIST, CERTIFIED REGISTERED

## 2024-11-14 PROCEDURE — 25010000002 FENTANYL CITRATE (PF) 50 MCG/ML SOLUTION: Performed by: NURSE ANESTHETIST, CERTIFIED REGISTERED

## 2024-11-14 PROCEDURE — 25010000002 CEFAZOLIN PER 500 MG: Performed by: STUDENT IN AN ORGANIZED HEALTH CARE EDUCATION/TRAINING PROGRAM

## 2024-11-14 PROCEDURE — 21931 EXC BACK LES SC 3 CM/>: CPT

## 2024-11-14 RX ORDER — ONDANSETRON 4 MG/1
4 TABLET, ORALLY DISINTEGRATING ORAL ONCE AS NEEDED
Status: DISCONTINUED | OUTPATIENT
Start: 2024-11-14 | End: 2024-11-14 | Stop reason: HOSPADM

## 2024-11-14 RX ORDER — MIDAZOLAM HYDROCHLORIDE 2 MG/2ML
2 INJECTION, SOLUTION INTRAMUSCULAR; INTRAVENOUS ONCE
Status: COMPLETED | OUTPATIENT
Start: 2024-11-14 | End: 2024-11-14

## 2024-11-14 RX ORDER — ONDANSETRON 2 MG/ML
4 INJECTION INTRAMUSCULAR; INTRAVENOUS ONCE AS NEEDED
Status: DISCONTINUED | OUTPATIENT
Start: 2024-11-14 | End: 2024-11-14 | Stop reason: HOSPADM

## 2024-11-14 RX ORDER — SODIUM CHLORIDE, SODIUM LACTATE, POTASSIUM CHLORIDE, CALCIUM CHLORIDE 600; 310; 30; 20 MG/100ML; MG/100ML; MG/100ML; MG/100ML
9 INJECTION, SOLUTION INTRAVENOUS CONTINUOUS PRN
Status: DISCONTINUED | OUTPATIENT
Start: 2024-11-14 | End: 2024-11-14 | Stop reason: HOSPADM

## 2024-11-14 RX ORDER — DEXAMETHASONE SODIUM PHOSPHATE 4 MG/ML
INJECTION, SOLUTION INTRA-ARTICULAR; INTRALESIONAL; INTRAMUSCULAR; INTRAVENOUS; SOFT TISSUE AS NEEDED
Status: DISCONTINUED | OUTPATIENT
Start: 2024-11-14 | End: 2024-11-14 | Stop reason: SURG

## 2024-11-14 RX ORDER — PROPOFOL 10 MG/ML
VIAL (ML) INTRAVENOUS AS NEEDED
Status: DISCONTINUED | OUTPATIENT
Start: 2024-11-14 | End: 2024-11-14 | Stop reason: SURG

## 2024-11-14 RX ORDER — LIDOCAINE HYDROCHLORIDE 20 MG/ML
INJECTION, SOLUTION EPIDURAL; INFILTRATION; INTRACAUDAL; PERINEURAL AS NEEDED
Status: DISCONTINUED | OUTPATIENT
Start: 2024-11-14 | End: 2024-11-14 | Stop reason: SURG

## 2024-11-14 RX ORDER — MEPERIDINE HYDROCHLORIDE 25 MG/ML
12.5 INJECTION INTRAMUSCULAR; INTRAVENOUS; SUBCUTANEOUS
Status: DISCONTINUED | OUTPATIENT
Start: 2024-11-14 | End: 2024-11-14 | Stop reason: HOSPADM

## 2024-11-14 RX ORDER — PROMETHAZINE HYDROCHLORIDE 25 MG/1
25 SUPPOSITORY RECTAL ONCE AS NEEDED
Status: DISCONTINUED | OUTPATIENT
Start: 2024-11-14 | End: 2024-11-14 | Stop reason: HOSPADM

## 2024-11-14 RX ORDER — SODIUM CHLORIDE 0.9 % (FLUSH) 0.9 %
10 SYRINGE (ML) INJECTION AS NEEDED
Status: DISCONTINUED | OUTPATIENT
Start: 2024-11-14 | End: 2024-11-14 | Stop reason: HOSPADM

## 2024-11-14 RX ORDER — HYDROCODONE BITARTRATE AND ACETAMINOPHEN 5; 325 MG/1; MG/1
1 TABLET ORAL EVERY 6 HOURS PRN
Qty: 15 TABLET | Refills: 0 | Status: SHIPPED | OUTPATIENT
Start: 2024-11-14

## 2024-11-14 RX ORDER — FENTANYL CITRATE 50 UG/ML
INJECTION, SOLUTION INTRAMUSCULAR; INTRAVENOUS AS NEEDED
Status: DISCONTINUED | OUTPATIENT
Start: 2024-11-14 | End: 2024-11-14 | Stop reason: SURG

## 2024-11-14 RX ORDER — SODIUM CHLORIDE, SODIUM LACTATE, POTASSIUM CHLORIDE, CALCIUM CHLORIDE 600; 310; 30; 20 MG/100ML; MG/100ML; MG/100ML; MG/100ML
70 INJECTION, SOLUTION INTRAVENOUS CONTINUOUS
Status: DISCONTINUED | OUTPATIENT
Start: 2024-11-14 | End: 2024-11-14 | Stop reason: HOSPADM

## 2024-11-14 RX ORDER — SODIUM CHLORIDE 9 MG/ML
40 INJECTION, SOLUTION INTRAVENOUS AS NEEDED
Status: DISCONTINUED | OUTPATIENT
Start: 2024-11-14 | End: 2024-11-14 | Stop reason: HOSPADM

## 2024-11-14 RX ORDER — ACETAMINOPHEN 500 MG
1000 TABLET ORAL ONCE
Status: COMPLETED | OUTPATIENT
Start: 2024-11-14 | End: 2024-11-14

## 2024-11-14 RX ORDER — PROMETHAZINE HYDROCHLORIDE 12.5 MG/1
25 TABLET ORAL ONCE AS NEEDED
Status: DISCONTINUED | OUTPATIENT
Start: 2024-11-14 | End: 2024-11-14 | Stop reason: HOSPADM

## 2024-11-14 RX ORDER — OXYCODONE HYDROCHLORIDE 5 MG/1
5 TABLET ORAL
Status: DISCONTINUED | OUTPATIENT
Start: 2024-11-14 | End: 2024-11-14 | Stop reason: HOSPADM

## 2024-11-14 RX ORDER — SODIUM CHLORIDE 0.9 % (FLUSH) 0.9 %
10 SYRINGE (ML) INJECTION EVERY 12 HOURS SCHEDULED
Status: DISCONTINUED | OUTPATIENT
Start: 2024-11-14 | End: 2024-11-14 | Stop reason: HOSPADM

## 2024-11-14 RX ORDER — ONDANSETRON 2 MG/ML
INJECTION INTRAMUSCULAR; INTRAVENOUS AS NEEDED
Status: DISCONTINUED | OUTPATIENT
Start: 2024-11-14 | End: 2024-11-14 | Stop reason: SURG

## 2024-11-14 RX ORDER — LIDOCAINE HYDROCHLORIDE AND EPINEPHRINE 10; 10 MG/ML; UG/ML
INJECTION, SOLUTION INFILTRATION; PERINEURAL AS NEEDED
Status: DISCONTINUED | OUTPATIENT
Start: 2024-11-14 | End: 2024-11-14 | Stop reason: HOSPADM

## 2024-11-14 RX ORDER — DEXMEDETOMIDINE HYDROCHLORIDE 100 UG/ML
INJECTION, SOLUTION INTRAVENOUS AS NEEDED
Status: DISCONTINUED | OUTPATIENT
Start: 2024-11-14 | End: 2024-11-14 | Stop reason: SURG

## 2024-11-14 RX ADMIN — SODIUM CHLORIDE 2000 MG: 9 INJECTION, SOLUTION INTRAVENOUS at 13:50

## 2024-11-14 RX ADMIN — MIDAZOLAM HYDROCHLORIDE 2 MG: 1 INJECTION, SOLUTION INTRAMUSCULAR; INTRAVENOUS at 13:45

## 2024-11-14 RX ADMIN — DEXAMETHASONE SODIUM PHOSPHATE 4 MG: 4 INJECTION, SOLUTION INTRAMUSCULAR; INTRAVENOUS at 14:05

## 2024-11-14 RX ADMIN — DEXMEDETOMIDINE HYDROCHLORIDE 10 MCG: 100 INJECTION, SOLUTION, CONCENTRATE INTRAVENOUS at 13:50

## 2024-11-14 RX ADMIN — DEXMEDETOMIDINE HYDROCHLORIDE 10 MCG: 100 INJECTION, SOLUTION, CONCENTRATE INTRAVENOUS at 13:56

## 2024-11-14 RX ADMIN — ACETAMINOPHEN 1000 MG: 500 TABLET ORAL at 11:14

## 2024-11-14 RX ADMIN — ONDANSETRON HYDROCHLORIDE 4 MG: 2 SOLUTION INTRAMUSCULAR; INTRAVENOUS at 14:05

## 2024-11-14 RX ADMIN — DEXMEDETOMIDINE HYDROCHLORIDE 10 MCG: 100 INJECTION, SOLUTION, CONCENTRATE INTRAVENOUS at 15:05

## 2024-11-14 RX ADMIN — SODIUM CHLORIDE, POTASSIUM CHLORIDE, SODIUM LACTATE AND CALCIUM CHLORIDE 9 ML/HR: 600; 310; 30; 20 INJECTION, SOLUTION INTRAVENOUS at 11:14

## 2024-11-14 RX ADMIN — FENTANYL CITRATE 100 MCG: 50 INJECTION, SOLUTION INTRAMUSCULAR; INTRAVENOUS at 13:56

## 2024-11-14 RX ADMIN — LIDOCAINE HYDROCHLORIDE 100 MG: 20 INJECTION, SOLUTION EPIDURAL; INFILTRATION; INTRACAUDAL; PERINEURAL at 13:56

## 2024-11-14 RX ADMIN — DEXMEDETOMIDINE HYDROCHLORIDE 10 MCG: 100 INJECTION, SOLUTION, CONCENTRATE INTRAVENOUS at 14:59

## 2024-11-14 RX ADMIN — PROPOFOL 200 MG: 10 INJECTION, EMULSION INTRAVENOUS at 13:56

## 2024-11-14 NOTE — DISCHARGE INSTRUCTIONS
DISCHARGE INSTRUCTIONS  SURGICAL / AMBULATORY  PROCEDURES      For your surgery you had:  General anesthesia (you may have a sore throat for the first 24 hours)  You may experience dizziness, drowsiness, or light-headedness for several hours following surgery/procedure.  Do not stay alone today or tonight.  Limit your activity for 24 hours.  Resume your diet slowly.  Follow whatever special dietary instructions you may have been given by your doctor.  You should not drive or operate machinery, drink alcohol, or sign legally binding documents for 24 hours or while you are taking pain medication.    NOTIFY YOUR DOCTOR IF YOU EXPERIENCE ANY OF THE FOLLOWING:  Temperature greater than 101 degrees Fahrenheit  Shaking Chills  Redness or excessive drainage from incision  Nausea, vomiting and/or pain that is not controlled by prescribed medications  Increase in bleeding or bleeding that is excessive  Unable to urinate in 6 hours after surgery  If unable to reach your doctor, please go to the closest Emergency Room  You may remove Band-Aid/dressing in 48 hours.  You may shower in 48 hours.  Apply an ice pack 24-48 hours.      Last dose of pain medication was given at:  Tylenol 1000mg at 11:14 do not exceed 4000mg in 24hours.  May take ibuprofen at anytime if able and needed.  May take norco next at anytime if needed.    Special Instructions:  Follow any verbal instructions given by Dr. Chun.

## 2024-11-14 NOTE — H&P
[facility]   GENERAL SURGERY  HISTORY AND PHYSICAL    Patient Name: Brooklyn Garcia  : 1990  MRN: [id number]  Primary Care Physician:  Mackenzie Price MD  Date of admission: 2024    Subjective     Chief Complaint:  back lipoma    HPI:  Brooklyn Garcia is a 33 y.o. male who presents today as a new referral for a lipoma along the upper right side of his back. Patient states he had a bump in this area for several months and he thinks it may have become somewhat larger. He does note some mild pain in the area but has no limitations with range of motion. He denies any nausea, vomiting, fevers, chills. He has no history of trauma to this area.     Personal History   Allergies:  No Known Allergies    Home Medications:  Prior to Admission medications    Medication Sig Start Date End Date Taking? Authorizing Provider   NON FORMULARY Take 2 tablets by mouth Daily. Weight gain supplement    Provider, MD Ivis   Diclofenac Sodium (VOLTAREN) 1 % gel gel Apply 4 g topically to the appropriate area as directed 4 (Four) Times a Day.  Patient not taking: Reported on 24  Mackenzie Price MD   naproxen (Naprosyn) 500 MG tablet Take 1 tablet by mouth 2 (Two) Times a Day With Meals. Prn pain  Patient not taking: Reported on 2024 3/30/23 11/14/24  Mackenzie Price MD   vitamin D (ERGOCALCIFEROL) 1.25 MG (70920 UT) capsule capsule Take 1 capsule by mouth 1 (One) Time Per Week.  Patient not taking: Reported on 24  Mackenzie Price MD   Vitamin D, Cholecalciferol, 50 MCG (2000 UT) capsule Take 1 capsule by mouth Daily.  Patient not taking: Reported on 24  Mackenzie Price MD       Past Medical History:   Diagnosis Date    Lipoma of back     Shoulder pain, left        History reviewed. No pertinent surgical history.    Social History:  reports that he has quit  "smoking. His smoking use included cigarettes. He started smoking about 6 years ago. He has a 1.7 pack-year smoking history. He has been exposed to tobacco smoke. He has never used smokeless tobacco. He reports current alcohol use. He reports that he does not use drugs.    Family History: family history includes No Known Problems in his father and mother. Otherwise pertinent FHx was reviewed and not pertinent to current issue.    Review of Systems: Review of systems is noncontributory besides as mentioned in above HPI section.   All systems were reviewed and negative except for: none    Objective   Vitals:   Temp:  [97.4 °F (36.3 °C)] 97.4 °F (36.3 °C)  Heart Rate:  [61] 61  Resp:  [16] 16  BP: (122)/(73) 122/73  Physical Exam   Constitutional: healthy appearing, alert, no acute distress, reliable historian  HENT:  NCAT, no visible deformities or lesions  Eyes:  sclerae clear, conjunctivae clear, EOMI  Neck:  normal appearance, no masses, trachea midline  Respiratory:  breathing not labored, respiratory effort appears normal  Cardiovascular:  heart regular rate and rhythm  Abdomen:  soft, nontender, nondistended    Skin and subcutaneous tissue:  no visible concerning rashes or lesions, no jaundice  Musculoskeletal: moving all extremities symmetrically and purposefully  Neurologic:  no obvious motor or sensory deficits, cerebellar function without any obvious abnormalities, alert & oriented x 3, speech clear  Psychiatric:  judgment and insight intact, mood normal, affect appropriate, cooperative                      Invalid input(s): \"PROT\"      No results found for: \"LIPASE\"  No results found for: \"INR\"  No results found for: \"LACTATE\"    Radiology:  No orders to display          LABS:  Lab Results (last 48 hours)       ** No results found for the last 48 hours. **            IMAGING:  Imaging Results (Last 48 Hours)       ** No results found for the last 48 hours. **            Result Review:  I have personally " reviewed the following checkmarked results from the time of this admission to 11/14/2024 11:29 EST:  [x]  Laboratory  []  Microbiology  []  Radiology  []  EKG/Telemetry   []  Cardiology/Vascular   []  Pathology  []  Old records  []  Other:        Assessment & Plan   Assessment / Plan     Assessment:  Active Hospital Problems:  Active Hospital Problems    Diagnosis     **Lipoma        Plan:   1. Lipoma of back (Primary)     33-year-old male with lipoma along his upper right back.  He desires surgical excision.  Will plan for outpatient lipoma excision and any other indicated procedure.  Risks/benefits/alternatives of the procedure were explained to the patient and he was agreeable to proceed.      Electronically signed by Karel Chun MD, 11/14/24, 11:29 AM EST.

## 2024-11-14 NOTE — OP NOTE
EXCISION LESION  Procedure Report    Patient Name:  Brooklyn Garcia  YOB: 1990    Date of Surgery:  11/14/2024     Indications: Large lipoma of the upper back    Pre-op Diagnosis:   Lipoma, unspecified site [D17.9]       Post-Op Diagnosis Codes:     * Lipoma, unspecified site [D17.9]    Procedure/CPT® Codes:      Procedure(s):  Excision of 7 cm upper back lipoma        Staff:  Surgeon(s):  Karel Chun MD    Assistant: Chantal Cornejo RN CSA    Anesthesia: Choice    Estimated Blood Loss: minimal    Implants:    Nothing was implanted during the procedure    Specimen:          Specimens       ID Source Type Tests Collected By Collected At Frozen?    A Back, Upper Tissue TISSUE PATHOLOGY EXAM   Karel Chun MD 11/14/24 7310     Description: Lipoma    This specimen was not marked as sent.                Findings: 7 cm lipoma    Complications: None apparent at the time of surgery    Description of Procedure: Informed consent was obtained before the patient was brought to the operating suite and placed in the left lateral decubitus position.  The upper back on the right side was prepped and draped in the standard sterile fashion.  Timeout procedure was performed, surgery, and other pertinent information.    Local anesthetic was administered around the palpable lipoma.  Using a #15 blade scalpel, an 8 cm transverse incision was made.  Sharp dissection was carried down with electrocautery until a large, flap lipoma was encountered.  Circumferential dissection used electrocautery was used to extricate the lipoma from surrounding subcutaneous tissue.  Lipoma was then sharply taken off the underlying fascia before being collected and sent for analysis by pathology.  Hemostasis was achieved with minimal electrocautery.  Wound was thoroughly irrigated with saline and dried.  Deep dermal layer was reapproximated using 2-0 Vicryl.  Skin was closed using interrupted 0 Prolene in horizontal mattress fashion before  application of a sterile dressing over top to conclude the procedure.    Patient tolerated the procedure well and there were no immediate complications.  All counts were correct x 2 at the end of the procedure.    Disposition: Stable in PACU        The surgical first assist listed above assisted with all needed aspects of the procedure.    Electronically signed by Karel Chun MD, 11/14/24, 2:50 PM EST.

## 2024-11-14 NOTE — ANESTHESIA POSTPROCEDURE EVALUATION
Patient: Brooklyn Garcia    Procedure Summary       Date: 11/14/24 Room / Location: [facility] OR  / [facility] MAIN OR    Anesthesia Start: 1349 Anesthesia Stop: 1511    Procedure: LIPOMA EXCISION, BACK Diagnosis:       Lipoma, unspecified site      (Lipoma, unspecified site [D17.9])    Surgeons: Karel Chun MD Provider: Farideh Manzo CRNA    Anesthesia Type: general ASA Status: 2            Anesthesia Type: general    Vitals  Vitals Value Taken Time   /53 11/14/24 1600   Temp 36.2 °C (97.1 °F) 11/14/24 1509   Pulse 68 11/14/24 1601   Resp 12 11/14/24 1530   SpO2 96 % 11/14/24 1601   Vitals shown include unfiled device data.        Post Anesthesia Care and Evaluation    Patient location during evaluation: bedside  Patient participation: complete - patient participated  Level of consciousness: awake  Pain management: adequate    Airway patency: patent  PONV Status: none  Cardiovascular status: acceptable and stable  Respiratory status: acceptable  Hydration status: acceptable

## 2024-11-14 NOTE — ANESTHESIA PREPROCEDURE EVALUATION
Anesthesia Evaluation     Patient summary reviewed and Nursing notes reviewed   no history of anesthetic complications:   NPO Solid Status: > 8 hours  NPO Liquid Status: > 2 hours           Airway   Mallampati: II  TM distance: >3 FB  Neck ROM: full  No difficulty expected  Dental - normal exam     Pulmonary - negative pulmonary ROS and normal exam    breath sounds clear to auscultation  (+) a smoker Current, vape,  Cardiovascular - negative cardio ROS and normal exam  Exercise tolerance: good (4-7 METS)    Rhythm: regular  Rate: normal        Neuro/Psych- negative ROS  GI/Hepatic/Renal/Endo - negative ROS     Musculoskeletal (-) negative ROS    Abdominal    Substance History - negative use     OB/GYN negative ob/gyn ROS         Other - negative ROS       ROS/Med Hx Other: PAT Nursing Notes unavailable.               Anesthesia Plan    ASA 2     general     (Patient understands anesthesia not responsible for dental damage.)  intravenous induction     Anesthetic plan, risks, benefits, and alternatives have been provided, discussed and informed consent has been obtained with: patient.    Use of blood products discussed with patient .    Plan discussed with CRNA.    CODE STATUS:

## 2024-11-18 LAB
CYTO UR: NORMAL
LAB AP CASE REPORT: NORMAL
LAB AP CLINICAL INFORMATION: NORMAL
PATH REPORT.FINAL DX SPEC: NORMAL
PATH REPORT.GROSS SPEC: NORMAL

## 2024-12-05 ENCOUNTER — TELEPHONE (OUTPATIENT)
Dept: SURGERY | Facility: CLINIC | Age: 34
End: 2024-12-05
Payer: COMMERCIAL

## 2024-12-06 ENCOUNTER — OFFICE VISIT (OUTPATIENT)
Dept: SURGERY | Facility: CLINIC | Age: 34
End: 2024-12-06
Payer: COMMERCIAL

## 2024-12-06 VITALS
HEART RATE: 79 BPM | SYSTOLIC BLOOD PRESSURE: 146 MMHG | HEIGHT: 70 IN | WEIGHT: 230.4 LBS | BODY MASS INDEX: 32.99 KG/M2 | DIASTOLIC BLOOD PRESSURE: 100 MMHG

## 2024-12-06 DIAGNOSIS — Z48.02 VISIT FOR SUTURE REMOVAL: Primary | ICD-10-CM

## 2024-12-06 RX ORDER — HYDROQUINONE 40 MG/G
1 CREAM TOPICAL 2 TIMES DAILY
COMMUNITY
Start: 2024-09-30

## 2024-12-06 NOTE — PROGRESS NOTES
"Chief Complaint  Post-op Follow-up (Excision of 7 cm upper back lipoma)    Subjective    Subjective     Quasikika Garcia is a 34 y.o. male who presents to [facility]    History of Present Illness  34-year-old male who presents today for routine postoperative follow-up after undergoing excision of a large upper back lipoma approximately 3 weeks ago.  Postoperatively he is doing well at this time and has no complaints.  Post-op Follow-up      Personal History     Social History     Tobacco Use    Smoking status: Former     Current packs/day: 0.25     Average packs/day: 0.3 packs/day for 6.9 years (1.7 ttl pk-yrs)     Types: Cigarettes     Start date: 2018     Passive exposure: Current    Smokeless tobacco: Never   Vaping Use    Vaping status: Every Day    Substances: Nicotine, Flavoring    Devices: Disposable   Substance Use Topics    Alcohol use: Yes     Comment: social    Drug use: Never     No Known Allergies    Objective    Objective       Vital Signs:   /100 (BP Location: Right arm, Patient Position: Sitting, Cuff Size: Adult)   Pulse 79   Ht 177.8 cm (70\")   Wt 105 kg (230 lb 6.4 oz)   BMI 33.06 kg/m²       Physical Exam  Constitutional:       Appearance: Normal appearance.   HENT:      Head: Normocephalic and atraumatic.      Mouth/Throat:      Mouth: Mucous membranes are moist.      Pharynx: Oropharynx is clear.   Cardiovascular:      Rate and Rhythm: Normal rate and regular rhythm.   Pulmonary:      Effort: Pulmonary effort is normal. No respiratory distress.   Abdominal:      General: There is no distension.      Palpations: Abdomen is soft.      Tenderness: There is no abdominal tenderness.   Musculoskeletal:         General: No swelling. Normal range of motion.      Cervical back: Normal range of motion and neck supple.   Skin:     General: Skin is warm and dry.      Comments: Right sided upper back incision healing well with sutures in place   Neurological:      " General: No focal deficit present.      Mental Status: He is alert and oriented to person, place, and time.   Psychiatric:         Mood and Affect: Mood normal.         Behavior: Behavior normal.                  Assessment / Plan      Diagnoses and all orders for this visit:    1. Visit for suture removal (Primary)    34-year-old male status post excision of large upper back lipoma.  Benign pathology reviewed.  Sutures removed today in the office without difficulty.  No issues postoperatively.  He may otherwise follow-up with me again in clinic as needed.    Follow Up   Return if symptoms worsen or fail to improve.      Patient was given instructions and counseling regarding his condition or for health maintenance advice. Please see specific information pulled into the AVS if appropriate.     Electronically signed by Karel Chun MD, 12/06/24, 1:21 PM EST.

## (undated) DEVICE — PENCL SMOKE/EVAC MEGADYNE TELESCP 10FT

## (undated) DEVICE — SOL IRR NACL 0.9PCT BO 1000ML

## (undated) DEVICE — SUT VIC 2/0 SH 27IN

## (undated) DEVICE — STERILE POLYISOPRENE POWDER-FREE SURGICAL GLOVES: Brand: PROTEXIS

## (undated) DEVICE — SUT ETHLN 2/0 FS 18IN 664H

## (undated) DEVICE — STERILE POLYISOPRENE POWDER-FREE SURGICAL GLOVES WITH EMOLLIENT COATING: Brand: PROTEXIS

## (undated) DEVICE — ELECTRD BLD EZ CLN MOD XLNG 2.75IN

## (undated) DEVICE — GAUZE,SPONGE,4"X4",16PLY,STRL,LF,10/TRAY: Brand: MEDLINE

## (undated) DEVICE — INTENDED FOR TISSUE SEPARATION, AND OTHER PROCEDURES THAT REQUIRE A SHARP SURGICAL BLADE TO PUNCTURE OR CUT.: Brand: BARD-PARKER ® CARBON RIB-BACK BLADES

## (undated) DEVICE — GOWN,REINFRCE,POLY,SIRUS,BREATH SLV,XXLG: Brand: MEDLINE

## (undated) DEVICE — GLV SURG SENSICARE PI ORTHO SZ6.5 LF STRL

## (undated) DEVICE — MAJOR-LF: Brand: MEDLINE INDUSTRIES, INC.